# Patient Record
Sex: FEMALE | Race: OTHER | HISPANIC OR LATINO
[De-identification: names, ages, dates, MRNs, and addresses within clinical notes are randomized per-mention and may not be internally consistent; named-entity substitution may affect disease eponyms.]

---

## 2019-09-12 PROBLEM — Z00.00 ENCOUNTER FOR PREVENTIVE HEALTH EXAMINATION: Status: ACTIVE | Noted: 2019-09-12

## 2019-10-14 ENCOUNTER — APPOINTMENT (OUTPATIENT)
Dept: VASCULAR SURGERY | Facility: CLINIC | Age: 59
End: 2019-10-14
Payer: COMMERCIAL

## 2019-10-14 DIAGNOSIS — Z78.9 OTHER SPECIFIED HEALTH STATUS: ICD-10-CM

## 2019-10-14 DIAGNOSIS — I83.813 VARICOSE VEINS OF BILATERAL LOWER EXTREMITIES WITH PAIN: ICD-10-CM

## 2019-10-14 DIAGNOSIS — I87.2 VENOUS INSUFFICIENCY (CHRONIC) (PERIPHERAL): ICD-10-CM

## 2019-10-14 PROCEDURE — 93970 EXTREMITY STUDY: CPT

## 2019-10-14 PROCEDURE — XXXXX: CPT

## 2020-04-21 PROBLEM — I87.2 VENOUS INSUFFICIENCY: Status: ACTIVE | Noted: 2020-04-21

## 2020-04-21 PROBLEM — Z78.9 NO HISTORY OF ALCOHOL USE: Status: ACTIVE | Noted: 2020-04-21

## 2020-07-22 PROBLEM — Z00.00 ENCOUNTER FOR PREVENTIVE HEALTH EXAMINATION: Noted: 2020-07-22

## 2020-08-18 ENCOUNTER — APPOINTMENT (OUTPATIENT)
Dept: CARDIOLOGY | Facility: CLINIC | Age: 60
End: 2020-08-18
Payer: COMMERCIAL

## 2020-08-18 VITALS
DIASTOLIC BLOOD PRESSURE: 80 MMHG | RESPIRATION RATE: 14 BRPM | BODY MASS INDEX: 30 KG/M2 | HEART RATE: 73 BPM | SYSTOLIC BLOOD PRESSURE: 134 MMHG | HEIGHT: 62 IN | WEIGHT: 163 LBS | TEMPERATURE: 98.7 F

## 2020-08-18 DIAGNOSIS — Z78.9 OTHER SPECIFIED HEALTH STATUS: ICD-10-CM

## 2020-08-18 DIAGNOSIS — Z82.49 FAMILY HISTORY OF ISCHEMIC HEART DISEASE AND OTHER DISEASES OF THE CIRCULATORY SYSTEM: ICD-10-CM

## 2020-08-18 PROCEDURE — 93000 ELECTROCARDIOGRAM COMPLETE: CPT

## 2020-08-18 PROCEDURE — 99204 OFFICE O/P NEW MOD 45 MIN: CPT

## 2020-08-19 RX ORDER — CANDESARTAN CILEXETIL 16 MG/1
16 TABLET ORAL DAILY
Qty: 90 | Refills: 3 | Status: DISCONTINUED | COMMUNITY
Start: 2020-08-18 | End: 2020-08-19

## 2020-08-19 RX ORDER — ATORVASTATIN CALCIUM 10 MG/1
10 TABLET, FILM COATED ORAL DAILY
Qty: 90 | Refills: 3 | Status: ACTIVE | COMMUNITY
Start: 1900-01-01 | End: 1900-01-01

## 2020-11-12 ENCOUNTER — APPOINTMENT (OUTPATIENT)
Dept: CARDIOLOGY | Facility: CLINIC | Age: 60
End: 2020-11-12

## 2020-11-16 ENCOUNTER — APPOINTMENT (OUTPATIENT)
Dept: CARDIOLOGY | Facility: CLINIC | Age: 60
End: 2020-11-16

## 2020-11-17 ENCOUNTER — APPOINTMENT (OUTPATIENT)
Dept: CARDIOLOGY | Facility: CLINIC | Age: 60
End: 2020-11-17
Payer: COMMERCIAL

## 2020-11-17 PROCEDURE — 93015 CV STRESS TEST SUPVJ I&R: CPT

## 2020-11-17 PROCEDURE — 93306 TTE W/DOPPLER COMPLETE: CPT

## 2020-11-30 ENCOUNTER — NON-APPOINTMENT (OUTPATIENT)
Age: 60
End: 2020-11-30

## 2020-11-30 ENCOUNTER — APPOINTMENT (OUTPATIENT)
Dept: CARDIOLOGY | Facility: CLINIC | Age: 60
End: 2020-11-30
Payer: COMMERCIAL

## 2020-11-30 VITALS
RESPIRATION RATE: 14 BRPM | WEIGHT: 163 LBS | HEART RATE: 80 BPM | HEIGHT: 62 IN | TEMPERATURE: 97.9 F | SYSTOLIC BLOOD PRESSURE: 148 MMHG | BODY MASS INDEX: 30 KG/M2 | DIASTOLIC BLOOD PRESSURE: 78 MMHG

## 2020-11-30 DIAGNOSIS — I10 ESSENTIAL (PRIMARY) HYPERTENSION: ICD-10-CM

## 2020-11-30 DIAGNOSIS — E78.00 PURE HYPERCHOLESTEROLEMIA, UNSPECIFIED: ICD-10-CM

## 2020-11-30 DIAGNOSIS — R94.39 ABNORMAL RESULT OF OTHER CARDIOVASCULAR FUNCTION STUDY: ICD-10-CM

## 2020-11-30 DIAGNOSIS — R01.1 CARDIAC MURMUR, UNSPECIFIED: ICD-10-CM

## 2020-11-30 PROCEDURE — 99214 OFFICE O/P EST MOD 30 MIN: CPT

## 2020-11-30 PROCEDURE — 93000 ELECTROCARDIOGRAM COMPLETE: CPT

## 2020-11-30 RX ORDER — DEXAMETHASONE 4 MG/1
4 TABLET ORAL
Qty: 14 | Refills: 0 | Status: DISCONTINUED | COMMUNITY
Start: 2020-10-26

## 2020-11-30 RX ORDER — IBUPROFEN 400 MG/1
400 TABLET, FILM COATED ORAL
Qty: 42 | Refills: 0 | Status: DISCONTINUED | COMMUNITY
Start: 2020-10-26

## 2020-11-30 RX ORDER — VALSARTAN 160 MG/1
160 TABLET, COATED ORAL
Qty: 90 | Refills: 2 | Status: ACTIVE | COMMUNITY
Start: 2020-08-19 | End: 1900-01-01

## 2020-11-30 NOTE — DISCUSSION/SUMMARY
[FreeTextEntry1] : Case and plan discussed with Dr. Shahid.\par \par 1 - Hypertension:  repeat blood pressure 130/68.  Well controlled on current medications. Advised to follow strict low sodium diet and weight loss.\par \par 2 - Hypercholesterolemia:  labs from PCP (8/31/2020) - cholesterol 161, HDL 58, LDL 83, triglycerides 99, TC/HDL 2.8.  Continue Atorvastatin 10mg daily.  Follow low fat, low cholesterol diet.\par \par 3 - Risk factors for coronary artery disease including strong family history:  without complaints of chest pain, shortness of breath, palpitations, lightheadedness or syncope. \par Echocardigram (11/17/2020):  EF 55-60%.  Mild mitral valve prolapse.  Mild mitral annular calcification.  Mild to moderate mitral valve regurgitation.  Trace aortic regurgitation.  Mild tricuspid regurgitation.  No evidence of pericardial effusion.\par Exercise stress test (11/17/2020):  Equivocal exercise stress test for ischemia.  Borderline EKG changes and shortness of breath at  low workload.  Due to limited mobility secondary to sciatica will schedule for 1-day pharmacologic nuclear stress test to rule out CAD.\par \par 4 - Labs from PCP:  glucose 80, HgA1c 5.2, TSH 1.68, H/H 12.3/38.2, BUN 16, creatinine 1.0, potassium 5.\par \par 5 - Follow up with Dr. hSahid after testing is completed.

## 2020-11-30 NOTE — REASON FOR VISIT
[FreeTextEntry1] : The patient is a pleasant 60-year-old  female, native of the [place], with a past medical history significant for hypertension as well as hypercholesterolemia who presents for follow up evaluation.

## 2020-11-30 NOTE — HISTORY OF PRESENT ILLNESS
[FreeTextEntry1] : Mrs. Shepard presents today for follow up and results of her recent echocardiogram and exercise stress test.  Presently she is feeling well.  Denies complaints of chest pain, shortness of breath, palpitations, lightheadedness or syncope.  Drinks 2 cups of coffee daily and tries to hydrate well.  Does her best to follow a low sodium diet.

## 2020-11-30 NOTE — PHYSICAL EXAM
[General Appearance - Well Developed] : well developed [Normal Conjunctiva] : the conjunctiva exhibited no abnormalities [Normal Oral Mucosa] : normal oral mucosa [] : no respiratory distress [Auscultation Breath Sounds / Voice Sounds] : lungs were clear to auscultation bilaterally [Heart Rate And Rhythm] : heart rate and rhythm were normal [Heart Sounds] : normal S1 and S2 [Edema] : no peripheral edema present [Bowel Sounds] : normal bowel sounds [Abnormal Walk] : normal gait [FreeTextEntry1] : No edema [Skin Color & Pigmentation] : normal skin color and pigmentation [Skin Turgor] : normal skin turgor [Oriented To Time, Place, And Person] : oriented to person, place, and time [Affect] : the affect was normal [Mood] : the mood was normal

## 2020-12-08 ENCOUNTER — APPOINTMENT (OUTPATIENT)
Dept: PHYSICAL MEDICINE AND REHAB | Facility: CLINIC | Age: 60
End: 2020-12-08
Payer: COMMERCIAL

## 2020-12-08 VITALS
TEMPERATURE: 96.3 F | BODY MASS INDEX: 30 KG/M2 | DIASTOLIC BLOOD PRESSURE: 75 MMHG | SYSTOLIC BLOOD PRESSURE: 146 MMHG | HEART RATE: 84 BPM | RESPIRATION RATE: 14 BRPM | HEIGHT: 62 IN | OXYGEN SATURATION: 99 % | WEIGHT: 163 LBS

## 2020-12-08 VITALS — TEMPERATURE: 96.3 F

## 2020-12-08 DIAGNOSIS — M75.81 OTHER SHOULDER LESIONS, RIGHT SHOULDER: ICD-10-CM

## 2020-12-08 PROCEDURE — 99202 OFFICE O/P NEW SF 15 MIN: CPT

## 2020-12-08 PROCEDURE — 99072 ADDL SUPL MATRL&STAF TM PHE: CPT

## 2020-12-08 NOTE — ASSESSMENT
[FreeTextEntry1] : 60 y.o. F w/ right shoulder RC tendinopathy.  Low clinical concern for high grade RCT tear.  Rx P.T. for modalities, gentle ROM, stretching and strengthening exercises.  May trial OTC Voltaren gel prn.  May consider US examination of right shoulder RC tendons if necessary.  RTC 6-8 weeks.

## 2020-12-08 NOTE — PHYSICAL EXAM
[FreeTextEntry1] : General: NAD, alert and oriented x 3\par Psych: normal mood, intact judgment and insight\par HEENT: NC/AT, normal visual tracking\par Pulmonary: normal respiratory effort, chest expansion appears symmetrical\par CV: extremities appear pink and well perfused\par Abd: non-distended\par Ext: no c/c/e\par Skin: no rashes seen on exposed skin, no visible abrasions\par \par Inspection: normal muscle bulk without asymmetry\par Palpation: TTP over left SS\par ROM shoulder: right PROM 140' ABD/FF w/ pain endROM\par MMT: 5/5 b/l UE; 5/5 b/l SS; 5-/5 R IS.\par Reflexes: symmetric bilateral biceps, triceps, brachioradialis\par Bernardo's -negative\par Sensory:SILT\par Provocative testing:\par Spurling's neg \par Empty can - neg\par Hawkin's + pos right\par Neer's +pos right\par Lift off -neg\par Scarf -neg\par Speed's -neg\par

## 2020-12-08 NOTE — REASON FOR VISIT
[Initial Evaluation] : an initial evaluation [FreeTextEntry1] : [id number] [FreeTextEntry2] : Josselyn [TWNoteComboBox1] : [place]

## 2020-12-08 NOTE — HISTORY OF PRESENT ILLNESS
[FreeTextEntry1] : Ms. RIVERA PANCHAL is a 59 yo F - had R LE pain that started end of October 2020 which resolved after ibuprofen, oral steroids, and NESTOR.  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  Denies neck pain.  Pain present to posterior upper right arm. Worse with overhead arm activity, better with rest and warm compress.  Denies paresthesia, weakness, ataxia, B/B incontinence, or saddle anesthesia.  Have had left shoulder CSI by PCP for similar symptoms (which helped).  No P.T.

## 2021-01-18 ENCOUNTER — APPOINTMENT (OUTPATIENT)
Dept: CARDIOLOGY | Facility: CLINIC | Age: 61
End: 2021-01-18

## 2021-01-25 ENCOUNTER — APPOINTMENT (OUTPATIENT)
Dept: CARDIOLOGY | Facility: CLINIC | Age: 61
End: 2021-01-25

## 2021-02-02 ENCOUNTER — APPOINTMENT (OUTPATIENT)
Dept: PHYSICAL MEDICINE AND REHAB | Facility: CLINIC | Age: 61
End: 2021-02-02

## 2023-01-25 ENCOUNTER — OFFICE (OUTPATIENT)
Dept: URBAN - METROPOLITAN AREA CLINIC 63 | Facility: CLINIC | Age: 63
Setting detail: OPHTHALMOLOGY
End: 2023-01-25
Payer: COMMERCIAL

## 2023-01-25 DIAGNOSIS — H35.033: ICD-10-CM

## 2023-01-25 DIAGNOSIS — H01.004: ICD-10-CM

## 2023-01-25 DIAGNOSIS — H25.13: ICD-10-CM

## 2023-01-25 DIAGNOSIS — H04.123: ICD-10-CM

## 2023-01-25 DIAGNOSIS — H01.001: ICD-10-CM

## 2023-01-25 PROCEDURE — 92014 COMPRE OPH EXAM EST PT 1/>: CPT | Performed by: STUDENT IN AN ORGANIZED HEALTH CARE EDUCATION/TRAINING PROGRAM

## 2023-01-25 ASSESSMENT — AXIALLENGTH_DERIVED
OS_AL: 21.8115
OD_AL: 21.9371

## 2023-01-25 ASSESSMENT — TONOMETRY
OS_IOP_MMHG: 17
OD_IOP_MMHG: 15

## 2023-01-25 ASSESSMENT — REFRACTION_CURRENTRX
OS_AXIS: 180
OD_VPRISM_DIRECTION: PROGS
OD_ADD: +2.50
OD_AXIS: 180
OS_OVR_VA: 20/
OS_VPRISM_DIRECTION: PROGS
OS_CYLINDER: 0.00
OS_ADD: +2.50
OS_SPHERE: +3.25
OD_CYLINDER: 0.00
OD_SPHERE: +3.25
OD_OVR_VA: 20/

## 2023-01-25 ASSESSMENT — SPHEQUIV_DERIVED
OD_SPHEQUIV: 3.625
OS_SPHEQUIV: 4

## 2023-01-25 ASSESSMENT — KERATOMETRY
OS_K1POWER_DIOPTERS: 44.25
OD_K1POWER_DIOPTERS: 44.25
OS_K2POWER_DIOPTERS: 44.75
OS_AXISANGLE_DEGREES: 144
OD_K2POWER_DIOPTERS: 44.75
OD_AXISANGLE_DEGREES: 016

## 2023-01-25 ASSESSMENT — LID EXAM ASSESSMENTS
OS_BLEPHARITIS: LUL T
OD_BLEPHARITIS: RUL T
OS_COMMENTS: MEIBOMIAN GLAND DYSFUNCTION
OD_COMMENTS: MEIBOMIAN GLAND DYSFUNCTION

## 2023-01-25 ASSESSMENT — SUPERFICIAL PUNCTATE KERATITIS (SPK): OD_SPK: 1+

## 2023-01-25 ASSESSMENT — REFRACTION_AUTOREFRACTION
OD_AXIS: 095
OS_AXIS: 080
OS_CYLINDER: -1.00
OD_CYLINDER: -0.75
OS_SPHERE: +4.50
OD_SPHERE: +4.00

## 2023-01-25 ASSESSMENT — VISUAL ACUITY
OS_BCVA: 20/25
OD_BCVA: 20/30-1

## 2023-01-25 ASSESSMENT — CONFRONTATIONAL VISUAL FIELD TEST (CVF)
OS_FINDINGS: FULL
OD_FINDINGS: FULL

## 2024-01-30 ENCOUNTER — OFFICE (OUTPATIENT)
Dept: URBAN - METROPOLITAN AREA CLINIC 63 | Facility: CLINIC | Age: 64
Setting detail: OPHTHALMOLOGY
End: 2024-01-30
Payer: COMMERCIAL

## 2024-01-30 DIAGNOSIS — H35.033: ICD-10-CM

## 2024-01-30 DIAGNOSIS — H25.13: ICD-10-CM

## 2024-01-30 DIAGNOSIS — H01.004: ICD-10-CM

## 2024-01-30 DIAGNOSIS — H01.001: ICD-10-CM

## 2024-01-30 PROCEDURE — 92250 FUNDUS PHOTOGRAPHY W/I&R: CPT | Performed by: STUDENT IN AN ORGANIZED HEALTH CARE EDUCATION/TRAINING PROGRAM

## 2024-01-30 PROCEDURE — 92014 COMPRE OPH EXAM EST PT 1/>: CPT | Performed by: STUDENT IN AN ORGANIZED HEALTH CARE EDUCATION/TRAINING PROGRAM

## 2024-01-30 ASSESSMENT — REFRACTION_CURRENTRX
OD_AXIS: 180
OD_ADD: +2.50
OD_CYLINDER: 0.00
OS_VPRISM_DIRECTION: PROGS
OD_OVR_VA: 20/
OS_SPHERE: +3.25
OS_AXIS: 180
OS_CYLINDER: 0.00
OS_OVR_VA: 20/
OS_ADD: +2.50
OD_SPHERE: +3.25
OD_VPRISM_DIRECTION: PROGS

## 2024-01-30 ASSESSMENT — LID EXAM ASSESSMENTS
OS_MEIBOMITIS: 1+
OD_BLEPHARITIS: RUL T
OD_MEIBOMITIS: 1+
OS_BLEPHARITIS: LUL T

## 2024-01-30 ASSESSMENT — REFRACTION_AUTOREFRACTION
OS_AXIS: 079
OS_SPHERE: +4.25
OS_CYLINDER: -0.50
OD_CYLINDER: -0.25
OD_AXIS: 094
OD_SPHERE: +3.75

## 2024-01-30 ASSESSMENT — SPHEQUIV_DERIVED
OS_SPHEQUIV: 4
OD_SPHEQUIV: 3.625

## 2024-01-30 ASSESSMENT — CONFRONTATIONAL VISUAL FIELD TEST (CVF)
OD_FINDINGS: FULL
OS_FINDINGS: FULL

## 2024-01-30 ASSESSMENT — SUPERFICIAL PUNCTATE KERATITIS (SPK): OD_SPK: 1+

## 2024-06-13 ENCOUNTER — APPOINTMENT (OUTPATIENT)
Dept: BREAST CENTER | Facility: CLINIC | Age: 64
End: 2024-06-13
Payer: COMMERCIAL

## 2024-06-13 DIAGNOSIS — N63.20 UNSPECIFIED LUMP IN THE LEFT BREAST, UNSPECIFIED QUADRANT: ICD-10-CM

## 2024-06-13 PROCEDURE — 19285Z: CUSTOM

## 2024-06-13 PROCEDURE — 99205 OFFICE O/P NEW HI 60 MIN: CPT | Mod: 25

## 2024-06-13 NOTE — ASSESSMENT
[FreeTextEntry1] : New diagnosis of left breast cancer  Patient assistance scheduling breast MRI and genetic screening  A Philomena localizing clip was placed into the left breast cancer  A total of 1 hour was spent in consultation evaluation review

## 2024-06-13 NOTE — HISTORY OF PRESENT ILLNESS
[FreeTextEntry1] : Patient presents to the office for evaluation of biopsy-proven poorly differentiated invasive ductal cancer superior left breast  The patient denies palpable breast mass, pain, skin thickening  She has no first-degree relatives with breast cancer

## 2024-06-13 NOTE — PROCEDURE
[FreeTextEntry3] : Ultrasound-guided placement of Philomena localizing device in the left breast cancer  After informed consent obtained, 1% lidocaine was infiltrated to the skin and a Philomena localizing device was placed into the left breast cancer.  No procedure related complications were noted  BI-RADS 6

## 2024-06-17 ENCOUNTER — OUTPATIENT (OUTPATIENT)
Dept: OUTPATIENT SERVICES | Facility: HOSPITAL | Age: 64
LOS: 1 days | End: 2024-06-17
Payer: COMMERCIAL

## 2024-06-17 ENCOUNTER — APPOINTMENT (OUTPATIENT)
Dept: MAMMOGRAPHY | Facility: CLINIC | Age: 64
End: 2024-06-17
Payer: COMMERCIAL

## 2024-06-17 ENCOUNTER — RESULT REVIEW (OUTPATIENT)
Age: 64
End: 2024-06-17

## 2024-06-17 DIAGNOSIS — N63.20 UNSPECIFIED LUMP IN THE LEFT BREAST, UNSPECIFIED QUADRANT: ICD-10-CM

## 2024-06-17 PROCEDURE — 77065 DX MAMMO INCL CAD UNI: CPT

## 2024-06-17 PROCEDURE — G0279: CPT | Mod: 26

## 2024-06-17 PROCEDURE — G0279: CPT

## 2024-06-17 PROCEDURE — 77065 DX MAMMO INCL CAD UNI: CPT | Mod: 26,LT

## 2024-06-24 ENCOUNTER — OUTPATIENT (OUTPATIENT)
Dept: OUTPATIENT SERVICES | Facility: HOSPITAL | Age: 64
LOS: 1 days | End: 2024-06-24
Payer: COMMERCIAL

## 2024-06-24 ENCOUNTER — APPOINTMENT (OUTPATIENT)
Dept: MRI IMAGING | Facility: CLINIC | Age: 64
End: 2024-06-24
Payer: COMMERCIAL

## 2024-06-24 DIAGNOSIS — N63.20 UNSPECIFIED LUMP IN THE LEFT BREAST, UNSPECIFIED QUADRANT: ICD-10-CM

## 2024-06-24 DIAGNOSIS — Z00.8 ENCOUNTER FOR OTHER GENERAL EXAMINATION: ICD-10-CM

## 2024-06-24 PROCEDURE — C8908: CPT

## 2024-06-24 PROCEDURE — 77049 MRI BREAST C-+ W/CAD BI: CPT | Mod: 26

## 2024-06-24 PROCEDURE — C8937: CPT

## 2024-06-24 PROCEDURE — A9585: CPT

## 2024-06-25 DIAGNOSIS — N63.10 UNSPECIFIED LUMP IN THE RIGHT BREAST, UNSPECIFIED QUADRANT: ICD-10-CM

## 2024-07-15 ENCOUNTER — APPOINTMENT (OUTPATIENT)
Dept: MRI IMAGING | Facility: CLINIC | Age: 64
End: 2024-07-15
Payer: COMMERCIAL

## 2024-07-15 ENCOUNTER — RESULT REVIEW (OUTPATIENT)
Age: 64
End: 2024-07-15

## 2024-07-15 PROCEDURE — A9585: CPT | Mod: JW

## 2024-07-15 PROCEDURE — A4648: CPT

## 2024-07-15 PROCEDURE — 19085Z: CUSTOM | Mod: RT

## 2024-07-15 PROCEDURE — 77065 DX MAMMO INCL CAD UNI: CPT | Mod: RT

## 2024-07-23 ENCOUNTER — APPOINTMENT (OUTPATIENT)
Dept: BREAST CENTER | Facility: CLINIC | Age: 64
End: 2024-07-23
Payer: COMMERCIAL

## 2024-07-23 DIAGNOSIS — C50.412 MALIGNANT NEOPLASM OF UPPER-OUTER QUADRANT OF LEFT FEMALE BREAST: ICD-10-CM

## 2024-07-23 DIAGNOSIS — Z17.0 MALIGNANT NEOPLASM OF UPPER-OUTER QUADRANT OF LEFT FEMALE BREAST: ICD-10-CM

## 2024-07-23 PROCEDURE — 76641 ULTRASOUND BREAST COMPLETE: CPT

## 2024-07-23 PROCEDURE — 99214 OFFICE O/P EST MOD 30 MIN: CPT | Mod: 25

## 2024-07-24 PROBLEM — C50.412 MALIGNANT NEOPLASM OF UPPER-OUTER QUADRANT OF LEFT BREAST IN FEMALE, ESTROGEN RECEPTOR POSITIVE: Status: ACTIVE | Noted: 2024-07-24

## 2024-07-24 NOTE — ASSESSMENT
[FreeTextEntry1] : Left breast cancer  Philomena signal appreciated  Care plan reviewed  Will proceed with left breast lumpectomy with left axillary sentinel lymph node biopsy  Postoperative medical and radiation oncology evaluation  A total of 45 minutes was spent in consultation

## 2024-07-24 NOTE — HISTORY OF PRESENT ILLNESS
[FreeTextEntry1] : Patient returns to the office for interval assessment  She has biopsy-proven poorly differentiated invasive ductal cancer of the left breast which is ER positive/SD positive and HER2 negative  Recent MRI guided breast biopsy was unremarkable  The patient feels well  Denies palpable mass mass

## 2024-07-24 NOTE — PROCEDURE
[FreeTextEntry3] : Ultrasound left breast  The patient has biopsy-proven invasive ductal cancer of the left breast  Targeted exam of the left breast demonstrates a highly suspicious 1.5 cm hypoechoic nodule  There is no suspicious lymphadenopathy  No additional lesions are noted  BI-RADS 6

## 2024-07-24 NOTE — HISTORY OF PRESENT ILLNESS
[FreeTextEntry1] : Patient returns to the office for interval assessment  She has biopsy-proven poorly differentiated invasive ductal cancer of the left breast which is ER positive/[place] positive and HER2 negative  Recent MRI guided breast biopsy was unremarkable  The patient feels well  Denies palpable mass mass

## 2024-07-26 ENCOUNTER — RESULT REVIEW (OUTPATIENT)
Age: 64
End: 2024-07-26

## 2024-07-31 RX ORDER — OXYCODONE AND ACETAMINOPHEN 5; 325 MG/1; MG/1
5-325 TABLET ORAL EVERY 6 HOURS
Qty: 12 | Refills: 0 | Status: ACTIVE | COMMUNITY
Start: 2024-07-31 | End: 1900-01-01

## 2024-08-01 ENCOUNTER — TRANSCRIPTION ENCOUNTER (OUTPATIENT)
Age: 64
End: 2024-08-01

## 2024-08-02 ENCOUNTER — RESULT REVIEW (OUTPATIENT)
Age: 64
End: 2024-08-02

## 2024-08-02 ENCOUNTER — TRANSCRIPTION ENCOUNTER (OUTPATIENT)
Age: 64
End: 2024-08-02

## 2024-08-02 ENCOUNTER — APPOINTMENT (OUTPATIENT)
Dept: BREAST CENTER | Facility: HOSPITAL | Age: 64
End: 2024-08-02

## 2024-08-05 PROBLEM — M51.26 OTHER INTERVERTEBRAL DISC DISPLACEMENT, LUMBAR REGION: Chronic | Status: ACTIVE | Noted: 2024-07-26

## 2024-08-05 PROBLEM — D21.9 BENIGN NEOPLASM OF CONNECTIVE AND OTHER SOFT TISSUE, UNSPECIFIED: Chronic | Status: ACTIVE | Noted: 2024-07-26

## 2024-08-05 PROBLEM — E78.5 HYPERLIPIDEMIA, UNSPECIFIED: Chronic | Status: ACTIVE | Noted: 2024-07-26

## 2024-08-05 PROBLEM — C50.912 MALIGNANT NEOPLASM OF UNSPECIFIED SITE OF LEFT FEMALE BREAST: Chronic | Status: ACTIVE | Noted: 2024-07-26

## 2024-08-08 ENCOUNTER — APPOINTMENT (OUTPATIENT)
Dept: BREAST CENTER | Facility: CLINIC | Age: 64
End: 2024-08-08

## 2024-08-08 PROCEDURE — 99024 POSTOP FOLLOW-UP VISIT: CPT

## 2024-08-18 ENCOUNTER — OUTPATIENT (OUTPATIENT)
Dept: OUTPATIENT SERVICES | Facility: HOSPITAL | Age: 64
LOS: 1 days | Discharge: HOME | End: 2024-08-18

## 2024-08-18 DIAGNOSIS — Z90.722 ACQUIRED ABSENCE OF OVARIES, BILATERAL: Chronic | ICD-10-CM

## 2024-08-18 DIAGNOSIS — Z98.890 OTHER SPECIFIED POSTPROCEDURAL STATES: Chronic | ICD-10-CM

## 2024-08-18 DIAGNOSIS — C50.919 MALIGNANT NEOPLASM OF UNSPECIFIED SITE OF UNSPECIFIED FEMALE BREAST: ICD-10-CM

## 2024-08-27 ENCOUNTER — NON-APPOINTMENT (OUTPATIENT)
Age: 64
End: 2024-08-27

## 2024-08-28 ENCOUNTER — APPOINTMENT (OUTPATIENT)
Dept: HEMATOLOGY ONCOLOGY | Facility: CLINIC | Age: 64
End: 2024-08-28
Payer: COMMERCIAL

## 2024-08-28 ENCOUNTER — RESULT REVIEW (OUTPATIENT)
Age: 64
End: 2024-08-28

## 2024-08-28 VITALS
HEART RATE: 62 BPM | BODY MASS INDEX: 27.97 KG/M2 | RESPIRATION RATE: 17 BRPM | TEMPERATURE: 97.8 F | HEIGHT: 62 IN | OXYGEN SATURATION: 100 % | WEIGHT: 152 LBS | DIASTOLIC BLOOD PRESSURE: 73 MMHG | SYSTOLIC BLOOD PRESSURE: 115 MMHG

## 2024-08-28 DIAGNOSIS — C50.412 MALIGNANT NEOPLASM OF UPPER-OUTER QUADRANT OF LEFT FEMALE BREAST: ICD-10-CM

## 2024-08-28 DIAGNOSIS — Z17.0 MALIGNANT NEOPLASM OF UPPER-OUTER QUADRANT OF LEFT FEMALE BREAST: ICD-10-CM

## 2024-08-28 LAB
BASOPHILS # BLD AUTO: 0 K/UL — SIGNIFICANT CHANGE UP (ref 0–0.2)
BASOPHILS NFR BLD AUTO: 0.9 % — SIGNIFICANT CHANGE UP (ref 0–2)
EOSINOPHIL # BLD AUTO: 0.1 K/UL — SIGNIFICANT CHANGE UP (ref 0–0.5)
EOSINOPHIL NFR BLD AUTO: 2.8 % — SIGNIFICANT CHANGE UP (ref 0–6)
HCT VFR BLD CALC: 41.6 % — SIGNIFICANT CHANGE UP (ref 34.5–45)
HGB BLD-MCNC: 13.1 G/DL — SIGNIFICANT CHANGE UP (ref 11.5–15.5)
LYMPHOCYTES # BLD AUTO: 1.4 K/UL — SIGNIFICANT CHANGE UP (ref 1–3.3)
LYMPHOCYTES # BLD AUTO: 28.1 % — SIGNIFICANT CHANGE UP (ref 13–44)
MCHC RBC-ENTMCNC: 28.6 PG — SIGNIFICANT CHANGE UP (ref 27–34)
MCHC RBC-ENTMCNC: 31.6 G/DL — LOW (ref 32–36)
MCV RBC AUTO: 90.7 FL — SIGNIFICANT CHANGE UP (ref 80–100)
MONOCYTES # BLD AUTO: 0.4 K/UL — SIGNIFICANT CHANGE UP (ref 0–0.9)
MONOCYTES NFR BLD AUTO: 8.6 % — SIGNIFICANT CHANGE UP (ref 2–14)
NEUTROPHILS # BLD AUTO: 2.9 K/UL — SIGNIFICANT CHANGE UP (ref 1.8–7.4)
NEUTROPHILS NFR BLD AUTO: 59.6 % — SIGNIFICANT CHANGE UP (ref 43–77)
PLATELET # BLD AUTO: 227 K/UL — SIGNIFICANT CHANGE UP (ref 150–400)
RBC # BLD: 4.59 M/UL — SIGNIFICANT CHANGE UP (ref 3.8–5.2)
RBC # FLD: 12.4 % — SIGNIFICANT CHANGE UP (ref 10.3–14.5)
WBC # BLD: 4.9 K/UL — SIGNIFICANT CHANGE UP (ref 3.8–10.5)
WBC # FLD AUTO: 4.9 K/UL — SIGNIFICANT CHANGE UP (ref 3.8–10.5)

## 2024-08-28 PROCEDURE — 99205 OFFICE O/P NEW HI 60 MIN: CPT

## 2024-08-28 PROCEDURE — G2211 COMPLEX E/M VISIT ADD ON: CPT

## 2024-08-28 RX ORDER — PROCHLORPERAZINE MALEATE 10 MG/1
10 TABLET ORAL EVERY 6 HOURS
Qty: 30 | Refills: 1 | Status: ACTIVE | COMMUNITY
Start: 2024-08-28 | End: 1900-01-01

## 2024-08-28 RX ORDER — LIDOCAINE AND PRILOCAINE 25; 25 MG/G; MG/G
2.5-2.5 CREAM TOPICAL
Qty: 1 | Refills: 2 | Status: ACTIVE | COMMUNITY
Start: 2024-08-28 | End: 1900-01-01

## 2024-08-28 RX ORDER — ONDANSETRON 8 MG/1
8 TABLET ORAL
Qty: 90 | Refills: 1 | Status: ACTIVE | COMMUNITY
Start: 2024-08-28 | End: 1900-01-01

## 2024-08-28 RX ORDER — DEXAMETHASONE 4 MG/1
4 TABLET ORAL TWICE DAILY
Qty: 24 | Refills: 0 | Status: ACTIVE | COMMUNITY
Start: 2024-08-28 | End: 1900-01-01

## 2024-08-28 NOTE — HISTORY OF PRESENT ILLNESS
[Disease: _____________________] : Disease: [unfilled] [T: ___] : T[unfilled] [N: ___] : N[unfilled] [AJCC Stage: ____] : AJCC Stage: [unfilled] [de-identified] : Katlyn Davalos is a 64 yr old post menopausal female with Left invasive ductal carcinoma ER >90% HI 52% HER-2 2+/Equivocal FISH-Non amplified diagnosed at age 64  She initially had a screening bilateral mammogram on 2/21/24 which showed questioned distortion in the outer left breast ~7cmfn which may be projecting superiorly. No suspicious masses, architectural distortion, or significant calcifications are detected in the right breast. She then had a left diagnostic mammogram 5/13/24 which demonstrated a left breast spiculated asymmetry at 12-1:00 6-cmfn. Targeted sonogram done on the same day showed 0.8 x 0.7 x 0.9 cm hypoechoic irregularly-shaped nodule at 1:00 7cmfn producing no posterior acoustic shadowing or enhancement. She underwent Left breast sonogram guided core biopsy on 6/3/24 which showed poorly differentiated invasive ductal carcinoma, Neal score 8/9 (3+3+2), measuring 0.8cm in maximal length, with no definitive lymphovascular invasion or in situ component, ER >90% HI 52% HER-2 2+/Equivocal FISH-Non amplified Ki-67 49%.Oncotype Dx breast recurrence score: RS 33 DRR at 9 years 21% CT Benefit >15% She had katerina. breast MRI on 6/24/24 which demonstrated 1.9 cm irregular enhancing mass containing a biopsy clip and localization device artifact in the upper slightly outer left breast corresponding to the site of recent biopsy-proven malignancy. A 0.9 cm linear non mass enhancement in the right central breast middle depth which is indeterminate. MRI guided biopsy was recommended. The MRI guided biopsy of the right central breast which showed benign breast tissue with apocrine cysts. Subsequently she underwent left breast lumpectomy with left axillary sentinel lymph node biopsy on 8/2/24. Pathology revealed Infiltrating ductal carcinoma, Neal score 9/9, measuring 1.2 x1.2 x 1.1 cm, with high grade focal DCIS, solid type, with focal lympho vascular invasion. All margins negative for invasive carcinoma and DCIS, closest margin >10mm from invasive carcinoma and DCIS, all lymph node (2) negative for tumor ([id number])   Imaging/Procedures:   2/21/24 Bilateral screening mammo-No suspicious masses, architectural distortion, or significant calcifications are detected in the right breast. Questioned distortion in the outer left breast ~7cmfn which may be projecting  superiorly. Diagnostic mammogarm with targeted ultrasound is recommended BI-RADS 0  5/13/24 Left diagnostic mammo/sono-A left breast asymmetry seen at the screening mammography persists on additional mammographic views, on which it appears as spiculated asymmetry at 12-1 o'clock 6cmfn. targeted ultrasound was performed. At 1:00 7cmfn, there is a 0.8 x 0.7 x0.9 cm hypoechoic irregularly-shaped nodule producing no posterior acoustic shadowing or enhancement corresponding to a nodule visualized at mammogram Ultrasound guided biopsy was recommended. BI-RADS 4  6/3/24 Left breast 1:00, 7cmfn, US guided core biopsy:  -Poorly differentiated invasive ductal carcinoma, Neal score 8/9 (3+3+2), measuring 0.8cm in maximal length in this material. -No definitive lymphovascular invasion -No in situ component identified ER >90% HI 52% HER-2 2+/Equivocal FISH-Non amplified Ki-67 49%  6/3/24 Oncotype Dx breast recurrence score: RS 33 DRR at 9 years 21% CT Benefit >15%  6/24/24 Katerina breast MRI: 1.9 cm irregular enhancing mass containing a biopsy clip and localization device artifact in the upper slightly outer left breast corresponding to the site of recent biopsy-proven malignancy. 0.9 cm linear nonmass enhancement in the right central breast middle depth which is indeterminate. MRI guided biopsy is recommended. Dominant enhancing focus measuring 0.4 cm in the upper central anterior right breast which is thought to be probably benign. Six-month follow-up breast MRI reevaluation is recommended. No lymphadenopathy BI-RADS 4A  7/15/24 Breast, right central, MRI guided core biopsy:Benign breast tissue with apocrine cysts  [facility]: no clinically significant variants detected  8/2/24 s/p Left breast lumpectomy with leftt axillary sentinel node biopsy: Final Diagnosis 1.  Lymph node (LEFT axillary sentinel node #1): -   One partially fatty lymph node negative carcinoma (0/1).  2. Lymph node (LEFT axillary sentinel node #2): -   One partially fatty lymph node negative carcinoma (0/1). -   Associated thermal type artifact.  3.  Breast (LEFT lumpectomy): -   INFILTRATING DUCTAL CARCINOMA, [place] score 3+ 3+ 3 = 9/9, measuring 1.2 x 1.2 x 1.1 cm (see synoptic summary). -   Ductal Carcinoma In Situ (DCIS), focal, solid type with high nuclear grade and focal necrosis. -   Lymphovascular invasion, focal. -   Biopsy/clip changes. All margins negative for invasive carcinoma and DCIS, closest margin >10mm from invasive carcinoma and DCIS, all lymph node (2) negative for tumor. [id number]    Patient presents today for an initial consultation, accompanied by her daughter.  She states she is interested in pursuing whichever treatment is most effective.  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   PMHx: HTN, HLD FMHx of CA: none reported SHx: abdominoplasty (3/2024), hysterectomy w/ oophorectomy (2014) SocHx: employed as home attendant    Care team: Breast surgery: Dr. Sixto Babb [de-identified] : Poorly differentiated invasive ductal carcinoma, 1.2 cm [de-identified] : ER >90% IL 52% HER-2 2+/Equivocal FISH-Non amplified Ki-67 49% [de-identified] : 6/3/24 Oncotype Dx breast recurrence score: RS 33 DRR at 9 years 21% CT Benefit >15%

## 2024-08-28 NOTE — RESULTS/DATA
[FreeTextEntry1] : [place] Accession Number : [id number] Patient:     RIVERA PEÑA Accession:                             [id number]  Collected Date/Time:                   8/2/2024 11:46 EDT Received Date/Time:                    8/2/2024 12:27 EDT  Surgical Pathology Report - Auth (Verified)  Specimen(s) Submitted 1  Left axillary sentinel node #1 2  Left axillary sentinel node #2 3  Left breast lumpectomy 4  Left breast lumpectomy anterior margin 5  Left breast lumpectomy posterior margin 6  Left breast lumpectomy superior margin 7  Left breast lumpectomy inferior margin 8  Left breast lumpectomy medial margin 9  Left breast lumpectomy lateral margin   Final Diagnosis 1.  Lymph node (LEFT axillary sentinel node #1): -   One partially fatty lymph node negative carcinoma (0/1).  2. Lymph node (LEFT axillary sentinel node #2): -   One partially fatty lymph node negative carcinoma (0/1). -   Associated thermal type artifact.  3.  Breast (LEFT lumpectomy): -   INFILTRATING DUCTAL CARCINOMA, [place] score 3+ 3+ 3 = 9/9, measuring 1.2 x 1.2 x 1.1 cm (see synoptic summary). -   Ductal Carcinoma In Situ (DCIS), focal, solid type with high nuclear grade and focal necrosis. -   Lymphovascular invasion, focal. -   Biopsy/clip changes.  4.  Breast tissue (Left breast lumpectomy, anterior margin): -   Negative for carcinoma.  5.  Breast tissue (Left breast lumpectomy, posterior margin): -   Negative for carcinoma.  6.  Breast tissue (Left breast lumpectomy, superior margin): -   Negative for carcinoma.  7.  Breast tissue (Left breast lumpectomy, inferior margin): -   Negative for carcinoma.  8.  Breast tissue (Left breast lumpectomy, medial margin): -   Negative for carcinoma.  9.  Breast tissue (Left breast lumpectomy, lateral margin): -   Negative for carcinoma.  Verified by: AMBER HDEZ M.D. (Electronic Signature) Reported on: 08/07/24 14:28 EDT, [facility], [address] [place] [address] Phone: [phone]   Fax: [phone] _________________________________________________________________  Comment 3.  All or portions of this case have undergone intradepartmental review. (2) (6)  Previous noted.  This case represents a current or recent malignant diagnosis and as such we will have the case reported to Tumor Registry.  Please be reminded that all tumor registry cases must be accurately staged and tracked.  - For inpatients, please complete the tumor staging form on the patient's chart based on the clinical data which you have compiled.  - Please remind your office to respond promptly to the Tumor Registrar's request for patient follow-up.   Synoptic Summary 3: Breast Invasive Carcinoma - Resection Specimen Procedure:  Lumpectomy Specimen Laterality:   Left Tumor Histologic Type:   Invasive carcinoma of no special type (ductal) Histologic Grade (Neal Histologic Score): Glandular (Acinar) / Tubular Differentiation:    Score 3 Nuclear Pleomorphism:   Score 3 Mitotic Rate:   Score 3 Overall Grade:   Grade 3 (scores of 8 or 9) Tumor Size:  12 Millimeters (mm) Ductal Carcinoma In Situ (DCIS):    Present Lymphatic and / or Vascular Invasion:    Present - Focal Treatment Effect in the Breast:   No known presurgical therapy Margins Margin Status for Invasive Carcinoma:    All margins negative for invasive carcinoma Distance from Invasive Carcinoma to Closest Margin:    Greater than 10 mm Margin Status for DCIS:   All margins negative for DCIS Distance from DCIS to Closest Margin:    Greater than 10 mm Closest Margin(s) to DCIS:   Anterior Regional Lymph Nodes Regional Lymph Node Status:   All regional lymph nodes negative for tumor Total Number of Lymph Nodes Examined (sentinel and non-sentinel): 2 Number of [place] Nodes Examined:    2 pTNM Classification (AJCC 8th Edition) pT Category:   pT1c pN Category:   pN0 N Suffix:  (sn) Best Tumor Blocks for Future Studies Tumor Block(s):   3C  CAP [facility] 2023 Q4 Release  23-WJ- (per submitted report from [facility]), Results of Breast Biomarkers are as follows  : ER:   Positive.  Greater than 90%.  Strong intensity. IL:   Positive.  52%.  Strong intensity. Her 2:   Equivocal (2+).  FISH: Non-amplified.  Ratio 1.4.  Ki-67:   49% nuclear staining.  Clinical Information Left breast cancer  Gross Description 1.  Received fresh labeled  "left axillary sentinel node #1"  is a 2 x 1.4 x 1.2 cm yellow-tan fibroadipose tissue fragment. Embedded within the fat is a 0.5 x 0.3 x 0.3 cm tan, blue dyed rubbery lymph node. The lymph node is bisected and entirely submitted in one cassette: 1A.  2.  Received fresh labeled  "left axillary sentinel node #2"  is a 2.1 x 1 x 0.9 cm yellow-tan fibroadipose tissue fragment. Embedded within the fat is a 0.6 x 0.5 x 0.5 cm tan, dyed blue rubbery lymph node. The lymph node is bisected and entirely submitted in one cassette: 2A.  3.  Received: Fresh labeled  "left breast lumpectomy, 1 stitch anterior, 2 stitches lateral" Size:  6.2 x 4.5 x 2.5 cm Orientation:  1 stitch anterior, 2 stitches lateral Inking:  Anterior = yellow, posterior = black, superior = blue, medial = green Area of Interest - Mass Size:  1.2 x 1.2 x 1.1 cm, pink-tan, ill-defined, firm Distance to Margins:  0.4 cm from the superior edge, 0.7 cm from the posterior edge, 0.8 cm from the inferior edge, 1 cm from the anterior edge, 1.4 cm from the medial edge, and 2.3 cm from the lateral edge MammoClip:  An infinity biopsy clip is identified  Remaining Breast Tissue:   Yellow-tan, fibrofatty, soft to rubbery Additional Comments:   A Philomena clip is identified Submitted:  The lesion is entirely submitted and the remaining tissue is representatively submitted in 5 cassettes: 3A: Fibroadipose tissue medially adjacent to mass with superior edge 3B-3C: Mass with superior and posterior edges and biopsy clip site in cassette 3C 3D: Mass with inferior and posterior edges 3E: Remainder of Mass with superior and posterior edges  Time of Collection:  11:46 AM 08/02/2024 Cut Surface Exposed to Formalin:   12:51 PM 08/02/2024 Ischemic Time:  1.08 hours Total Fixation Time in Formalin:   79.65 hours Note: The breast specimen processed exceeds the fixation time standards of 6-72 hrs., which have been set by the American Society of Clinical Oncology (ASCO) and the College of American Pathologists (CAP), to ensure appropriate tissue quality for effective Her2 receptor testing. Note: The breast specimen ischemic time exceeds the standard of less than 60 minutes.  4.  Received in formalin labeled  "left breast lumpectomy anterior margin"  is a 1.5 x 1 x 0.6 cm yellow-tan fibroadipose tissue fragment with one surface previously inked green and an attached undesignated clip. The fragment is bisected and entirely submitted in one cassette: 4A.  5.  Received in formalin labeled  "left breast lumpectomy posterior margin"  is a 2.1 x 1.6 x 0.7 cm yellow-tan fibroadipose tissue fragment with one surface previously inked black and an attached undesignated clip. The fragment is serially sectioned and entirely submitted in two cassettes: 5A-5B.  6.  Received in formalin labeled  "left breast lumpectomy superior margin"  is a 2.3 x 1.6 x 0.6 cm yellow-tan, shaggy, friable fibroadipose tissue fragment with one surface previously inked red and an attached undesignated clip. The fragment is serially sectioned and entirely submitted in two cassettes: 6A-6B.  7.  Received in formalin labeled  "left breast lumpectomy inferior margin"  is a 1.5 x 1.2 x 0.8 cm yellow-tan fibroadipose tissue fragment with one surface previously inked blue and an attached undesignated clip. The fragment is bisected and entirely submitted in one cassette: 7A.  8.  Received in formalin labeled  "left breast lumpectomy medial margin"   is a 1.3 x 1.3 x 0.5 cm yellow-tan fibroadipose tissue fragment with one surface previously inked yellow and an attached undesignated clip. The fragment is trisected entirely submitted in one cassette: 8A.  9.  Received in formalin labeled  "left breast lumpectomy lateral margin"  is a 2.6 x 1.1 x 0.6 cm yellow-tan fibroadipose tissue fragment with one surface previously inked orange and an attached undesignated clip. The fragment is serially sectioned and entirely submitted in two cassettes: 9A-9B.  RAYNE Parnell (ASCP) 08/05/2024 08:14 AM  RAYNE Parnell (ASCP) 08/06/2024 09:28 AM   Disclaimer In addition to other data that may appear on the specimen containers, all labels have been inspected to confirm the presence of the patient's name and date of birth.

## 2024-08-28 NOTE — PHYSICAL EXAM
[Normal] : PERRL, EOMI, no conjunctival infection, anicteric [de-identified] : well healing lumpectomy scar left breast

## 2024-08-28 NOTE — ADDENDUM
[FreeTextEntry1] : Documented by Bossman Corral acting as scribe for Dr. Leroy on 08/28/2024.   All Medical record entries made by the Scribe were at my, Dr. Leroy's, direction and personally dictated by me on 08/28/2024. I have reviewed the chart and agree that the record accurately reflects my personal performance of the history, physical exam, assessment and plan. I have also personally directed, reviewed, and agreed with the discharge instructions.

## 2024-08-28 NOTE — RESULTS/DATA
[FreeTextEntry1] : [place]ina Accession Number : [id number] Patient:     RIVERA PEÑA Accession:                             [id number]  Collected Date/Time:                   8/2/2024 11:46 EDT Received Date/Time:                    8/2/2024 12:27 EDT  Surgical Pathology Report - Auth (Verified)  Specimen(s) Submitted 1  Left axillary sentinel node #1 2  Left axillary sentinel node #2 3  Left breast lumpectomy 4  Left breast lumpectomy anterior margin 5  Left breast lumpectomy posterior margin 6  Left breast lumpectomy superior margin 7  Left breast lumpectomy inferior margin 8  Left breast lumpectomy medial margin 9  Left breast lumpectomy lateral margin   Final Diagnosis 1.  Lymph node (LEFT axillary sentinel node #1): -   One partially fatty lymph node negative carcinoma (0/1).  2. Lymph node (LEFT axillary sentinel node #2): -   One partially fatty lymph node negative carcinoma (0/1). -   Associated thermal type artifact.  3.  Breast (LEFT lumpectomy): -   INFILTRATING DUCTAL CARCINOMA, [place]rose score 3+ 3+ 3 = 9/9, measuring 1.2 x 1.2 x 1.1 cm (see synoptic summary). -   Ductal Carcinoma In Situ (DCIS), focal, solid type with high nuclear grade and focal necrosis. -   Lymphovascular invasion, focal. -   Biopsy/clip changes.  4.  Breast tissue (Left breast lumpectomy, anterior margin): -   Negative for carcinoma.  5.  Breast tissue (Left breast lumpectomy, posterior margin): -   Negative for carcinoma.  6.  Breast tissue (Left breast lumpectomy, superior margin): -   Negative for carcinoma.  7.  Breast tissue (Left breast lumpectomy, inferior margin): -   Negative for carcinoma.  8.  Breast tissue (Left breast lumpectomy, medial margin): -   Negative for carcinoma.  9.  Breast tissue (Left breast lumpectomy, lateral margin): -   Negative for carcinoma.  Verified by: AMBER HDEZ M.D. (Electronic Signature) Reported on: 08/07/24 14:28 EDT, [facility], [address] [place] [address] Phone: [phone]   Fax: [phone] _________________________________________________________________  Comment 3.  All or portions of this case have undergone intradepartmental review. (2) (6)  Previous noted.  This case represents a current or recent malignant diagnosis and as such we will have the case reported to Tumor Registry.  Please be reminded that all tumor registry cases must be accurately staged and tracked.  - For inpatients, please complete the tumor staging form on the patient's chart based on the clinical data which you have compiled.  - Please remind your office to respond promptly to the Tumor Registrar's request for patient follow-up.   Synoptic Summary 3: Breast Invasive Carcinoma - Resection Specimen Procedure:  Lumpectomy Specimen Laterality:   Left Tumor Histologic Type:   Invasive carcinoma of no special type (ductal) Histologic Grade (Neal Histologic Score): Glandular (Acinar) / Tubular Differentiation:    Score 3 Nuclear Pleomorphism:   Score 3 Mitotic Rate:   Score 3 Overall Grade:   Grade 3 (scores of 8 or 9) Tumor Size:  12 Millimeters (mm) Ductal Carcinoma In Situ (DCIS):    Present Lymphatic and / or Vascular Invasion:    Present - Focal Treatment Effect in the Breast:   No known presurgical therapy Margins Margin Status for Invasive Carcinoma:    All margins negative for invasive carcinoma Distance from Invasive Carcinoma to Closest Margin:    Greater than 10 mm Margin Status for DCIS:   All margins negative for DCIS Distance from DCIS to Closest Margin:    Greater than 10 mm Closest Margin(s) to DCIS:   Anterior Regional Lymph Nodes Regional Lymph Node Status:   All regional lymph nodes negative for tumor Total Number of Lymph Nodes Examined (sentinel and non-sentinel): 2 Number of [place] Nodes Examined:    2 pTNM Classification (AJCC 8th Edition) pT Category:   pT1c pN Category:   pN0 N Suffix:  (sn) Best Tumor Blocks for Future Studies Tumor Block(s):   3C  CAP [facility] 2023 Q4 Release  90-SH- (per submitted report from [facility]), Results of Breast Biomarkers are as follows  : ER:   Positive.  Greater than 90%.  Strong intensity. MO:   Positive.  52%.  Strong intensity. Her 2:   Equivocal (2+).  FISH: Non-amplified.  Ratio 1.4.  Ki-67:   49% nuclear staining.  Clinical Information Left breast cancer  Gross Description 1.  Received fresh labeled  "left axillary sentinel node #1"  is a 2 x 1.4 x 1.2 cm yellow-tan fibroadipose tissue fragment. Embedded within the fat is a 0.5 x 0.3 x 0.3 cm tan, blue dyed rubbery lymph node. The lymph node is bisected and entirely submitted in one cassette: 1A.  2.  Received fresh labeled  "left axillary sentinel node #2"  is a 2.1 x 1 x 0.9 cm yellow-tan fibroadipose tissue fragment. Embedded within the fat is a 0.6 x 0.5 x 0.5 cm tan, dyed blue rubbery lymph node. The lymph node is bisected and entirely submitted in one cassette: 2A.  3.  Received: Fresh labeled  "left breast lumpectomy, 1 stitch anterior, 2 stitches lateral" Size:  6.2 x 4.5 x 2.5 cm Orientation:  1 stitch anterior, 2 stitches lateral Inking:  Anterior = yellow, posterior = black, superior = blue, medial = green Area of Interest - Mass Size:  1.2 x 1.2 x 1.1 cm, pink-tan, ill-defined, firm Distance to Margins:  0.4 cm from the superior edge, 0.7 cm from the posterior edge, 0.8 cm from the inferior edge, 1 cm from the anterior edge, 1.4 cm from the medial edge, and 2.3 cm from the lateral edge MammoClip:  An infinity biopsy clip is identified  Remaining Breast Tissue:   Yellow-tan, fibrofatty, soft to rubbery Additional Comments:   A Philomena clip is identified Submitted:  The lesion is entirely submitted and the remaining tissue is representatively submitted in 5 cassettes: 3A: Fibroadipose tissue medially adjacent to mass with superior edge 3B-3C: Mass with superior and posterior edges and biopsy clip site in cassette 3C 3D: Mass with inferior and posterior edges 3E: Remainder of Mass with superior and posterior edges  Time of Collection:  11:46 AM 08/02/2024 Cut Surface Exposed to Formalin:   12:51 PM 08/02/2024 Ischemic Time:  1.08 hours Total Fixation Time in Formalin:   79.65 hours Note: The breast specimen processed exceeds the fixation time standards of 6-72 hrs., which have been set by the American Society of Clinical Oncology (ASCO) and the College of American Pathologists (CAP), to ensure appropriate tissue quality for effective Her2 receptor testing. Note: The breast specimen ischemic time exceeds the standard of less than 60 minutes.  4.  Received in formalin labeled  "left breast lumpectomy anterior margin"  is a 1.5 x 1 x 0.6 cm yellow-tan fibroadipose tissue fragment with one surface previously inked green and an attached undesignated clip. The fragment is bisected and entirely submitted in one cassette: 4A.  5.  Received in formalin labeled  "left breast lumpectomy posterior margin"  is a 2.1 x 1.6 x 0.7 cm yellow-tan fibroadipose tissue fragment with one surface previously inked black and an attached undesignated clip. The fragment is serially sectioned and entirely submitted in two cassettes: 5A-5B.  6.  Received in formalin labeled  "left breast lumpectomy superior margin"  is a 2.3 x 1.6 x 0.6 cm yellow-tan, shaggy, friable fibroadipose tissue fragment with one surface previously inked red and an attached undesignated clip. The fragment is serially sectioned and entirely submitted in two cassettes: 6A-6B.  7.  Received in formalin labeled  "left breast lumpectomy inferior margin"  is a 1.5 x 1.2 x 0.8 cm yellow-tan fibroadipose tissue fragment with one surface previously inked blue and an attached undesignated clip. The fragment is bisected and entirely submitted in one cassette: 7A.  8.  Received in formalin labeled  "left breast lumpectomy medial margin"   is a 1.3 x 1.3 x 0.5 cm yellow-tan fibroadipose tissue fragment with one surface previously inked yellow and an attached undesignated clip. The fragment is trisected entirely submitted in one cassette: 8A.  9.  Received in formalin labeled  "left breast lumpectomy lateral margin"  is a 2.6 x 1.1 x 0.6 cm yellow-tan fibroadipose tissue fragment with one surface previously inked orange and an attached undesignated clip. The fragment is serially sectioned and entirely submitted in two cassettes: 9A-9B.  RAYNE Parnell (ASCP) 08/05/2024 08:14 AM  RAYNE Parnell (ASCP) 08/06/2024 09:28 AM   Disclaimer In addition to other data that may appear on the specimen containers, all labels have been inspected to confirm the presence of the patient's name and date of birth.

## 2024-08-28 NOTE — ASSESSMENT
[FreeTextEntry1] : 64 yr old female with stage 1A ([id number]) Left invasive ductal carcinoma ER >90% MA 52% HER-2 2+/Equivocal FISH-Non amplified diagnosed at age 64.  7/23/24 [facility] Genetics- Negative: no clinically significant variants detected  8/2/24 s/p Left breast lumpectomy with leftt axillary sentinel node biopsy Pathology c/w Infiltrating ductal carcinoma, Neal score 9/9, measuring 1.2 x1.2 x 1.1 cm, with high grade focal DCIS, solid type, with focal lympho vascular invasion. All margins negative for invasive carcinoma and DCIS. 0/2 Lns. pT1cN0.  6/3/24 Oncotype Dx RS: RS 33 with DRR at 11 years 21% with Gustafson or AI, chemo Benefit >15%  Today we discussed the available radiographic and pathologic data in detail. We also discussed the natural history of the disease, as well as management options.  We discussed role of Oncotype Dx RS  which ascertains low, intermediate, or high recurrence risk and helps guide decisions about adjuvant chemotherapy.  Oncotype Dx RS 33 is high risk and adjuvant chemotherapy was recommended.   We discussed TC (docetaxel/cyclophosphamide) x 4 cycles as chemotherapy regimen and discussed that side effects include but are not limited to fatigue, diarrhea nausea, vomiting, allergic reactions, increased risk of infection, low blood counts, neuropathy, and hair loss (~5% patients can have permanent hair loss).   Discussed alternative regimen of dose dense CMF every 2 weeks x 8 cycles.   She is agreeable to proceed with TC and signed informed consent.   Chemotherapy will be followed by subsequent RT and endocrine therapy.   Plan: -port placement -begin TC x 4 sessions after port placement -referred to [facility] for initial evaluation -RTO 2 weeks after C1 of TC
[FreeTextEntry1] : 64 yr old female with stage 1A ([id number]) Left invasive ductal carcinoma ER >90% CA 52% HER-2 2+/Equivocal FISH-Non amplified diagnosed at age 64.  7/23/24 [facility] Genetics- Negative: no clinically significant variants detected  8/2/24 s/p Left breast lumpectomy with leftt axillary sentinel node biopsy Pathology c/w Infiltrating ductal carcinoma, Neal score 9/9, measuring 1.2 x1.2 x 1.1 cm, with high grade focal DCIS, solid type, with focal lympho vascular invasion. All margins negative for invasive carcinoma and DCIS. 0/2 Lns. pT1cN0.  6/3/24 Oncotype Dx RS: RS 33 with DRR at 11 years 21% with Gustafson or AI, chemo Benefit >15%  Today we discussed the available radiographic and pathologic data in detail. We also discussed the natural history of the disease, as well as management options.  We discussed role of Oncotype Dx RS  which ascertains low, intermediate, or high recurrence risk and helps guide decisions about adjuvant chemotherapy.  Oncotype Dx RS 33 is high risk and adjuvant chemotherapy was recommended.   We discussed TC (docetaxel/cyclophosphamide) x 4 cycles as chemotherapy regimen and discussed that side effects include but are not limited to fatigue, diarrhea nausea, vomiting, allergic reactions, increased risk of infection, low blood counts, neuropathy, and hair loss (~5% patients can have permanent hair loss).   Discussed alternative regimen of dose dense CMF every 2 weeks x 8 cycles.   She is agreeable to proceed with TC and signed informed consent.   Chemotherapy will be followed by subsequent RT and endocrine therapy.   Plan: -port placement -begin TC x 4 sessions after port placement -referred to [facility] for initial evaluation -RTO 2 weeks after C1 of TC
Yes

## 2024-08-28 NOTE — PHYSICAL EXAM
[Normal] : PERRL, EOMI, no conjunctival infection, anicteric [de-identified] : well healing lumpectomy scar left breast

## 2024-08-28 NOTE — CONSULT LETTER
[Dear  ___] : Dear  [unfilled], [Please see my note below.] : Please see my note below. [Consult Closing:] : Thank you very much for allowing me to participate in the care of this patient.  If you have any questions, please do not hesitate to contact me. [Sincerely,] : Sincerely, [( Thank you for referring [unfilled] for consultation for _____ )] : Thank you for referring [unfilled] for consultation for [unfilled] [FreeTextEntry3] : Sterling Leroy MD Medical Oncology/Hematology [facility] [place], [facility]   [facility] [employer] [facility]   [DrBeatriz  ___] : Dr. DEL RIO

## 2024-08-28 NOTE — CONSULT LETTER
[Dear  ___] : Dear  [unfilled], [Please see my note below.] : Please see my note below. [Consult Closing:] : Thank you very much for allowing me to participate in the care of this patient.  If you have any questions, please do not hesitate to contact me. [Sincerely,] : Sincerely, [( Thank you for referring [unfilled] for consultation for _____ )] : Thank you for referring [unfilled] for consultation for [unfilled] [FreeTextEntry3] : Sterling Leroy MD Medical Oncology/Hematology [facility] [place], [facility]   [facility] [employer] [facility] at [facility]   [DrBeatriz  ___] : Dr. DEL RIO

## 2024-08-28 NOTE — REASON FOR VISIT
[[employer] ] : provided by [employer]   [Time Spent: ____ minutes] : Total time spent using  services: [unfilled] minutes. The patient's primary language is not English thus required  services. [Family Member] : family member [FreeTextEntry2] : Left Breast cancer [Interpreters_IDNumber] : [id number] [Interpreters_FullName] : [place] [TWNoteComboBox1] : [place]

## 2024-08-28 NOTE — REASON FOR VISIT
[[employer] ] : provided by [employer]   [Time Spent: ____ minutes] : Total time spent using  services: [unfilled] minutes. The patient's primary language is not English thus required  services. [Family Member] : family member [FreeTextEntry2] : Left Breast cancer [Interpreters_IDNumber] : [id number] [Interpreters_FullName] : [place] [TWNoteComboBox1] : [place]ian

## 2024-08-28 NOTE — HISTORY OF PRESENT ILLNESS
[Disease: _____________________] : Disease: [unfilled] [T: ___] : T[unfilled] [N: ___] : N[unfilled] [AJCC Stage: ____] : AJCC Stage: [unfilled] [de-identified] : Katlyn Davalos is a 64 yr old post menopausal female with Left invasive ductal carcinoma ER >90% NE 52% HER-2 2+/Equivocal FISH-Non amplified diagnosed at age 64  She initially had a screening bilateral mammogram on 2/21/24 which showed questioned distortion in the outer left breast ~7cmfn which may be projecting superiorly. No suspicious masses, architectural distortion, or significant calcifications are detected in the right breast. She then had a left diagnostic mammogram 5/13/24 which demonstrated a left breast spiculated asymmetry at 12-1:00 6-cmfn. Targeted sonogram done on the same day showed 0.8 x 0.7 x 0.9 cm hypoechoic irregularly-shaped nodule at 1:00 7cmfn producing no posterior acoustic shadowing or enhancement. She underwent Left breast sonogram guided core biopsy on 6/3/24 which showed poorly differentiated invasive ductal carcinoma, Neal score 8/9 (3+3+2), measuring 0.8cm in maximal length, with no definitive lymphovascular invasion or in situ component, ER >90% NE 52% HER-2 2+/Equivocal FISH-Non amplified Ki-67 49%.Oncotype Dx breast recurrence score: RS 33 DRR at 11 years 21% CT Benefit >15% She had katerina. breast MRI on 6/24/24 which demonstrated 1.9 cm irregular enhancing mass containing a biopsy clip and localization device artifact in the upper slightly outer left breast corresponding to the site of recent biopsy-proven malignancy. A 0.9 cm linear non mass enhancement in the right central breast middle depth which is indeterminate. MRI guided biopsy was recommended. The MRI guided biopsy of the right central breast which showed benign breast tissue with apocrine cysts. Subsequently she underwent left breast lumpectomy with left axillary sentinel lymph node biopsy on 8/2/24. Pathology revealed Infiltrating ductal carcinoma, Neal score 9/9, measuring 1.2 x1.2 x 1.1 cm, with high grade focal DCIS, solid type, with focal lympho vascular invasion. All margins negative for invasive carcinoma and DCIS, closest margin >10mm from invasive carcinoma and DCIS, all lymph node (2) negative for tumor ([id number])   Imaging/Procedures:   2/21/24 Bilateral screening mammo-No suspicious masses, architectural distortion, or significant calcifications are detected in the right breast. Questioned distortion in the outer left breast ~7cmfn which may be projecting  superiorly. Diagnostic mammogarm with targeted ultrasound is recommended BI-RADS 0  5/13/24 Left diagnostic mammo/sono-A left breast asymmetry seen at the screening mammography persists on additional mammographic views, on which it appears as spiculated asymmetry at 12-1 o'clock 6cmfn. targeted ultrasound was performed. At 1:00 7cmfn, there is a 0.8 x 0.7 x0.9 cm hypoechoic irregularly-shaped nodule producing no posterior acoustic shadowing or enhancement corresponding to a nodule visualized at mammogram Ultrasound guided biopsy was recommended. BI-RADS 4  6/3/24 Left breast 1:00, 7cmfn, US guided core biopsy:  -Poorly differentiated invasive ductal carcinoma, Neal score 8/9 (3+3+2), measuring 0.8cm in maximal length in this material. -No definitive lymphovascular invasion -No in situ component identified ER >90% NE 52% HER-2 2+/Equivocal FISH-Non amplified Ki-67 49%  6/3/24 Oncotype Dx breast recurrence score: RS 33 DRR at 11 years 21% CT Benefit >15%  6/24/24 Katerina breast MRI: 1.9 cm irregular enhancing mass containing a biopsy clip and localization device artifact in the upper slightly outer left breast corresponding to the site of recent biopsy-proven malignancy. 0.9 cm linear nonmass enhancement in the right central breast middle depth which is indeterminate. MRI guided biopsy is recommended. Dominant enhancing focus measuring 0.4 cm in the upper central anterior right breast which is thought to be probably benign. Six-month follow-up breast MRI reevaluation is recommended. No lymphadenopathy BI-RADS 4A  7/15/24 Breast, right central, MRI guided core biopsy:Benign breast tissue with apocrine cysts  [facility]- Negative: no clinically significant variants detected  8/2/24 s/p Left breast lumpectomy with leftt axillary sentinel node biopsy: Final Diagnosis 1.  Lymph node (LEFT axillary sentinel node #1): -   One partially fatty lymph node negative carcinoma (0/1).  2. Lymph node (LEFT axillary sentinel node #2): -   One partially fatty lymph node negative carcinoma (0/1). -   Associated thermal type artifact.  3.  Breast (LEFT lumpectomy): -   INFILTRATING DUCTAL CARCINOMA, [place] score 3+ 3+ 3 = 9/9, measuring 1.2 x 1.2 x 1.1 cm (see synoptic summary). -   Ductal Carcinoma In Situ (DCIS), focal, solid type with high nuclear grade and focal necrosis. -   Lymphovascular invasion, focal. -   Biopsy/clip changes. All margins negative for invasive carcinoma and DCIS, closest margin >10mm from invasive carcinoma and DCIS, all lymph node (2) negative for tumor. [id number]    Patient presents today for an initial consultation, accompanied by her daughter.  She states she is interested in pursuing whichever treatment is most effective.  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   PMHx: HTN, HLD FMHx of CA: none reported SHx: abdominoplasty (3/2024), hysterectomy w/ oophorectomy (2014) SocHx: employed as home attendant    Care team: Breast surgery: Dr. Sixto Babb [de-identified] : Poorly differentiated invasive ductal carcinoma, 1.2 cm [de-identified] : ER >90% NH 52% HER-2 2+/Equivocal FISH-Non amplified Ki-67 49% [de-identified] : 6/3/24 Oncotype Dx breast recurrence score: RS 33 DRR at 11 years 21% CT Benefit >15%

## 2024-08-29 LAB
ALBUMIN SERPL ELPH-MCNC: 4.6 G/DL
ALP BLD-CCNC: 85 U/L
ALT SERPL-CCNC: 10 U/L
ANION GAP SERPL CALC-SCNC: 11 MMOL/L
AST SERPL-CCNC: 15 U/L
BILIRUB SERPL-MCNC: 0.5 MG/DL
BUN SERPL-MCNC: 18 MG/DL
CALCIUM SERPL-MCNC: 9.9 MG/DL
CHLORIDE SERPL-SCNC: 101 MMOL/L
CO2 SERPL-SCNC: 28 MMOL/L
CREAT SERPL-MCNC: 0.94 MG/DL
EGFR: 69 ML/MIN/1.73M2
GLUCOSE SERPL-MCNC: 91 MG/DL
HBV CORE IGG+IGM SER QL: REACTIVE
HBV CORE IGM SER QL: NONREACTIVE
HBV SURFACE AB SER QL: ABNORMAL
HBV SURFACE AG SER QL: NONREACTIVE
HCV AB SER QL: NONREACTIVE
HCV S/CO RATIO: 0.16 S/CO
INR PPP: 0.95 RATIO
POTASSIUM SERPL-SCNC: 4.4 MMOL/L
PROT SERPL-MCNC: 7.3 G/DL
PT BLD: 10.9 SEC
SODIUM SERPL-SCNC: 140 MMOL/L

## 2024-08-30 ENCOUNTER — APPOINTMENT (OUTPATIENT)
Dept: HEMATOLOGY ONCOLOGY | Facility: CLINIC | Age: 64
End: 2024-08-30

## 2024-09-03 LAB
HEPB DNA PCR INT: NOT DETECTED
HEPB DNA PCR LOG: NOT DETECTED LOGIU/ML

## 2024-09-09 ENCOUNTER — NON-APPOINTMENT (OUTPATIENT)
Age: 64
End: 2024-09-09

## 2024-09-09 NOTE — HISTORY OF PRESENT ILLNESS
[FreeTextEntry1] :  PRE CALL PRIOR TO APPOINTMENT:    Phone Number: [phone]  RX on file:  yes  Referring MD:  Rad  Appointment date:  1960  Currently on Chemotherapy:  no              CBC within 48 hours:  WBC:       ANC:    Diabetic:    Clotting or Bleeding disorders:  no  PPM / Defibrillator:  no  NPO status advised:  yes  History of fall:   no    Assistant device for walking:  na   home:   [facility]   Blood Thinners:  None  Prescribing MD agree to have medication held:  n/a  Capacity to make decisions: yes  HCP:  no  DNR:  no  Person contact for Pre-Call:  Patient    Guidelines for Screening Anesthesia / Sedation Cases     (TYPE YES OR NO)      Obtain Prescription / Authorization from Referring Physician: YES              Medical Necessity (Justification of the need for Anesthesia / Sedation) from referring Physician: YES            Have you taken oral sedation? (e.g. Xanax, Valium, and other oral sedatives):  NO               Clearance to receive Anesthesia / Sedation: YES- BY Dr. Gupta        ANESTHESIA EXCLUSION CRITERIA QUESTIONNAIRE:         Difficult Airway: (TYPE YES OR NO) no       Previous Problem with Anesthesia or sedation: NO             History of Difficult IntubatioN: NO             Stridor, Snoring, Sleep Apnea: None  Tonsils / Adenoids present: YES   Dysmorphic Facial Features: NO                 Cervical Spine Fusion/ Cervical Spine Surgery: NO               Tumor in Airway: NO       Trauma to Airway: NO    Radiation therapy to head / neck: NO      Advanced Rheumatoid ArthritiS: NO         Active Cardiac Ischemia (as defined by EKG or Laboratory  Examination): NO          Severe COPD / Home O2: NO       Known or Suspected Increased Intracranial pressure: NO                Patient with BMI of 40 or greater : NO    Weight (kgs.)  149        Height (ft.)  5'2         BMI                  Patients with Increased Risk of Aspiration: (TYPE YES OR NO)                          Abnormal Airway: NO     Hiatal Hernia with Reflux: NO      Pregnancy: NO                   Altered Mental State: NO                               Spinal Cord Injury with Paraplegia or Quadriplegia: NO                     History of delayed Gastric Emptying: NO                                  ASA Physical Status of 3 or greater (See Appendix 1 from policy ANSL.5502)                              Malignant Hyperthermia Screening: (TYPE YES OR NO)       Family history unexpected death following anesthesia: NO             Family or personal history of Malignant Hyperthermia: NO                A muscle or neuromuscular disorder: NO                 [facility] -Operative Assessment ( Day of Procedure)      Procedure: Port Insert  Indication:    NPO status:  Accompanied by:    Falls risk:  na  Labs: see chart    IVL:   IR MD: Dr. Sheron Cornelius    Urine Pregnancy: na  Pre-Op instructions: provided  POST-OP teaching initiated:  yes  Allergy bracelet on:   Anesthesia plan discussed with IR MD:  yes  Antibiotic given:

## 2024-09-09 NOTE — REASON FOR VISIT
[Procedure: _________] : a [unfilled] procedure visit [FreeTextEntry1] : Dx:Malignant neoplasm of the left breast

## 2024-09-18 ENCOUNTER — APPOINTMENT (OUTPATIENT)
Dept: INTERVENTIONAL RADIOLOGY/VASCULAR | Facility: CLINIC | Age: 64
End: 2024-09-18
Payer: COMMERCIAL

## 2024-09-18 ENCOUNTER — OUTPATIENT (OUTPATIENT)
Dept: OUTPATIENT SERVICES | Facility: HOSPITAL | Age: 64
LOS: 1 days | End: 2024-09-18

## 2024-09-18 VITALS
TEMPERATURE: 98 F | OXYGEN SATURATION: 100 % | DIASTOLIC BLOOD PRESSURE: 78 MMHG | RESPIRATION RATE: 16 BRPM | SYSTOLIC BLOOD PRESSURE: 153 MMHG | HEART RATE: 65 BPM

## 2024-09-18 DIAGNOSIS — C50.412 MALIGNANT NEOPLASM OF UPPER-OUTER QUADRANT OF LEFT FEMALE BREAST: ICD-10-CM

## 2024-09-18 DIAGNOSIS — Z90.722 ACQUIRED ABSENCE OF OVARIES, BILATERAL: Chronic | ICD-10-CM

## 2024-09-18 DIAGNOSIS — Z98.890 OTHER SPECIFIED POSTPROCEDURAL STATES: Chronic | ICD-10-CM

## 2024-09-18 PROCEDURE — 77001 FLUOROGUIDE FOR VEIN DEVICE: CPT | Mod: 26

## 2024-09-18 PROCEDURE — 76937 US GUIDE VASCULAR ACCESS: CPT | Mod: 26

## 2024-09-18 PROCEDURE — 36561 INSERT TUNNELED CV CATH: CPT | Mod: RT

## 2024-09-18 NOTE — HISTORY OF PRESENT ILLNESS
[FreeTextEntry1] : PRE CALL PRIOR TO APPOINTMENT:    Phone Number: [phone]  RX on file:  yes  Referring MD:  Rad  Appointment date:  1962  Currently on Chemotherapy:  no              CBC within 48 hours:  WBC:       ANC:    Diabetic:    Clotting or Bleeding disorders:  no  PPM / Defibrillator:  no  NPO status advised:  yes  History of fall:   no    Assistant device for walking:  na   home:   [facility]   Blood Thinners:  None  Prescribing MD agree to have medication held:  n/a  Capacity to make decisions: yes  HCP:  no  DNR:  no  Person contact for Pre-Call:  Patient    Guidelines for Screening Anesthesia / Sedation Cases     (TYPE YES OR NO)      Obtain Prescription / Authorization from Referring Physician: YES              Medical Necessity (Justification of the need for Anesthesia / Sedation) from referring Physician: YES            Have you taken oral sedation? (e.g. Xanax, Valium, and other oral sedatives):  NO               Clearance to receive Anesthesia / Sedation: YES- BY Dr. Gupta        ANESTHESIA EXCLUSION CRITERIA QUESTIONNAIRE:         Difficult Airway: (TYPE YES OR NO) no       Previous Problem with Anesthesia or sedation: NO             History of Difficult IntubatioN: NO             Stridor, Snoring, Sleep Apnea: None  Tonsils / Adenoids present: YES   Dysmorphic Facial Features: NO                 Cervical Spine Fusion/ Cervical Spine Surgery: NO               Tumor in Airway: NO       Trauma to Airway: NO    Radiation therapy to head / neck: NO      Advanced Rheumatoid ArthritiS: NO         Active Cardiac Ischemia (as defined by EKG or Laboratory  Examination): NO          Severe COPD / Home O2: NO       Known or Suspected Increased Intracranial pressure: NO                Patient with BMI of 40 or greater : NO    Weight (kgs.)  149        Height (ft.)  5'2         BMI                  Patients with Increased Risk of Aspiration: (TYPE YES OR NO)                          Abnormal Airway: NO     Hiatal Hernia with Reflux: NO      Pregnancy: NO                   Altered Mental State: NO                               Spinal Cord Injury with Paraplegia or Quadriplegia: NO                     History of delayed Gastric Emptying: NO                                  ASA Physical Status of 3 or greater (See Appendix 1 from policy ANSL.5502)                              Malignant Hyperthermia Screening: (TYPE YES OR NO)       Family history unexpected death following anesthesia: NO             Family or personal history of Malignant Hyperthermia: NO                A muscle or neuromuscular disorder: NO                 [facility] -Operative Assessment ( Day of Procedure)      Procedure: Port Insert  Indication:  breast CA  NPO status: 9/17/24 @ 2100  Accompanied by:  daughter  Falls risk:  na  Labs: see chart    IVL: #22 L AC by SS  IR MD: Dr. Sheron Cornelius    Urine Pregnancy: na  Pre-Op instructions: provided  POST-OP teaching initiated:  yes  Allergy bracelet on: NKDA   Anesthesia plan discussed with IR MD:  yes  Antibiotic given: no

## 2024-09-18 NOTE — HISTORY OF PRESENT ILLNESS
[FreeTextEntry1] : PRE CALL PRIOR TO APPOINTMENT:    Phone Number: [phone]  RX on file:  yes  Referring MD:  Rad  Appointment date:  1962  Currently on Chemotherapy:  no              CBC within 48 hours:  WBC:       ANC:    Diabetic:    Clotting or Bleeding disorders:  no  PPM / Defibrillator:  no  NPO status advised:  yes  History of fall:   no    Assistant device for walking:  na   home:   [facility]   Blood Thinners:  None  Prescribing MD agree to have medication held:  n/a  Capacity to make decisions: yes  HCP:  no  DNR:  no  Person contact for Pre-Call:  Patient    Guidelines for Screening Anesthesia / Sedation Cases     (TYPE YES OR NO)      Obtain Prescription / Authorization from Referring Physician: YES              Medical Necessity (Justification of the need for Anesthesia / Sedation) from referring Physician: YES            Have you taken oral sedation? (e.g. Xanax, Valium, and other oral sedatives):  NO               Clearance to receive Anesthesia / Sedation: YES- BY Dr. Gupta        ANESTHESIA EXCLUSION CRITERIA QUESTIONNAIRE:         Difficult Airway: (TYPE YES OR NO) no       Previous Problem with Anesthesia or sedation: NO             History of Difficult IntubatioN: NO             Stridor, Snoring, Sleep Apnea: None  Tonsils / Adenoids present: YES   Dysmorphic Facial Features: NO                 Cervical Spine Fusion/ Cervical Spine Surgery: NO               Tumor in Airway: NO       Trauma to Airway: NO    Radiation therapy to head / neck: NO      Advanced Rheumatoid ArthritiS: NO         Active Cardiac Ischemia (as defined by EKG or Laboratory  Examination): NO          Severe COPD / Home O2: NO       Known or Suspected Increased Intracranial pressure: NO                Patient with BMI of 40 or greater : NO    Weight (kgs.)  149        Height (ft.)  5'2         BMI                  Patients with Increased Risk of Aspiration: (TYPE YES OR NO)                          Abnormal Airway: NO     Hiatal Hernia with Reflux: NO      Pregnancy: NO                   Altered Mental State: NO                               Spinal Cord Injury with Paraplegia or Quadriplegia: NO                     History of delayed Gastric Emptying: NO                                  ASA Physical Status of 3 or greater (See Appendix 1 from policy ANSL.5502)                              Malignant Hyperthermia Screening: (TYPE YES OR NO)       Family history unexpected death following anesthesia: NO             Family or personal history of Malignant Hyperthermia: NO                A muscle or neuromuscular disorder: NO                 [facility] -Operative Assessment ( Day of Procedure)      Procedure: Port Insert  Indication:  breast CA  NPO status: 9/17/24 @ 2100  Accompanied by:  daughter  Falls risk:  na  Labs: see chart    IVL: #22 L AC by SS  IR MD: Dr. Sheron Cornelius    Urine Pregnancy: na  Pre-Op instructions: provided  POST-OP teaching initiated:  yes  Allergy bracelet on: NKDA   Anesthesia plan discussed with IR MD:  yes  Antibiotic given: no

## 2024-09-25 ENCOUNTER — NON-APPOINTMENT (OUTPATIENT)
Age: 64
End: 2024-09-25

## 2024-09-26 ENCOUNTER — RESULT REVIEW (OUTPATIENT)
Age: 64
End: 2024-09-26

## 2024-09-26 ENCOUNTER — APPOINTMENT (OUTPATIENT)
Age: 64
End: 2024-09-26

## 2024-09-26 LAB
BASOPHILS # BLD AUTO: 0 K/UL — SIGNIFICANT CHANGE UP (ref 0–0.2)
BASOPHILS NFR BLD AUTO: 0.2 % — SIGNIFICANT CHANGE UP (ref 0–2)
EOSINOPHIL # BLD AUTO: 0 K/UL — SIGNIFICANT CHANGE UP (ref 0–0.5)
EOSINOPHIL NFR BLD AUTO: 0.1 % — SIGNIFICANT CHANGE UP (ref 0–6)
HCT VFR BLD CALC: 40.9 % — SIGNIFICANT CHANGE UP (ref 34.5–45)
HGB BLD-MCNC: 13 G/DL — SIGNIFICANT CHANGE UP (ref 11.5–15.5)
LYMPHOCYTES # BLD AUTO: 0.9 K/UL — LOW (ref 1–3.3)
LYMPHOCYTES # BLD AUTO: 8.6 % — LOW (ref 13–44)
MCHC RBC-ENTMCNC: 28.5 PG — SIGNIFICANT CHANGE UP (ref 27–34)
MCHC RBC-ENTMCNC: 31.8 G/DL — LOW (ref 32–36)
MCV RBC AUTO: 89.8 FL — SIGNIFICANT CHANGE UP (ref 80–100)
MONOCYTES # BLD AUTO: 0.1 K/UL — SIGNIFICANT CHANGE UP (ref 0–0.9)
MONOCYTES NFR BLD AUTO: 1.2 % — LOW (ref 2–14)
NEUTROPHILS # BLD AUTO: 9 K/UL — HIGH (ref 1.8–7.4)
NEUTROPHILS NFR BLD AUTO: 90 % — HIGH (ref 43–77)
PLATELET # BLD AUTO: 238 K/UL — SIGNIFICANT CHANGE UP (ref 150–400)
RBC # BLD: 4.56 M/UL — SIGNIFICANT CHANGE UP (ref 3.8–5.2)
RBC # FLD: 11.9 % — SIGNIFICANT CHANGE UP (ref 10.3–14.5)
WBC # BLD: 10 K/UL — SIGNIFICANT CHANGE UP (ref 3.8–10.5)
WBC # FLD AUTO: 10 K/UL — SIGNIFICANT CHANGE UP (ref 3.8–10.5)

## 2024-09-27 DIAGNOSIS — Z51.89 ENCOUNTER FOR OTHER SPECIFIED AFTERCARE: ICD-10-CM

## 2024-09-27 DIAGNOSIS — R11.2 NAUSEA WITH VOMITING, UNSPECIFIED: ICD-10-CM

## 2024-09-27 DIAGNOSIS — Z51.11 ENCOUNTER FOR ANTINEOPLASTIC CHEMOTHERAPY: ICD-10-CM

## 2024-09-27 LAB
ALBUMIN SERPL ELPH-MCNC: 4.4 G/DL — SIGNIFICANT CHANGE UP (ref 3.3–5)
ALP SERPL-CCNC: 72 U/L — SIGNIFICANT CHANGE UP (ref 40–120)
ALT FLD-CCNC: 12 U/L — SIGNIFICANT CHANGE UP (ref 10–45)
ANION GAP SERPL CALC-SCNC: 15 MMOL/L — SIGNIFICANT CHANGE UP (ref 5–17)
AST SERPL-CCNC: 21 U/L — SIGNIFICANT CHANGE UP (ref 10–40)
BILIRUB SERPL-MCNC: 0.4 MG/DL — SIGNIFICANT CHANGE UP (ref 0.2–1.2)
BUN SERPL-MCNC: 16 MG/DL — SIGNIFICANT CHANGE UP (ref 7–23)
CALCIUM SERPL-MCNC: 9.8 MG/DL — SIGNIFICANT CHANGE UP (ref 8.4–10.5)
CHLORIDE SERPL-SCNC: 100 MMOL/L — SIGNIFICANT CHANGE UP (ref 96–108)
CO2 SERPL-SCNC: 22 MMOL/L — SIGNIFICANT CHANGE UP (ref 22–31)
CREAT SERPL-MCNC: 0.77 MG/DL — SIGNIFICANT CHANGE UP (ref 0.5–1.3)
EGFR: 87 ML/MIN/1.73M2 — SIGNIFICANT CHANGE UP
GLUCOSE SERPL-MCNC: 156 MG/DL — HIGH (ref 70–99)
POTASSIUM SERPL-MCNC: 3.8 MMOL/L — SIGNIFICANT CHANGE UP (ref 3.5–5.3)
POTASSIUM SERPL-SCNC: 3.8 MMOL/L — SIGNIFICANT CHANGE UP (ref 3.5–5.3)
PROT SERPL-MCNC: 7.3 G/DL — SIGNIFICANT CHANGE UP (ref 6–8.3)
SODIUM SERPL-SCNC: 137 MMOL/L — SIGNIFICANT CHANGE UP (ref 135–145)

## 2024-10-04 ENCOUNTER — OUTPATIENT (OUTPATIENT)
Dept: OUTPATIENT SERVICES | Facility: HOSPITAL | Age: 64
LOS: 1 days | Discharge: HOME | End: 2024-10-04

## 2024-10-04 DIAGNOSIS — Z90.722 ACQUIRED ABSENCE OF OVARIES, BILATERAL: Chronic | ICD-10-CM

## 2024-10-04 DIAGNOSIS — C50.919 MALIGNANT NEOPLASM OF UNSPECIFIED SITE OF UNSPECIFIED FEMALE BREAST: ICD-10-CM

## 2024-10-04 DIAGNOSIS — Z98.890 OTHER SPECIFIED POSTPROCEDURAL STATES: Chronic | ICD-10-CM

## 2024-10-07 ENCOUNTER — APPOINTMENT (OUTPATIENT)
Dept: HEMATOLOGY ONCOLOGY | Facility: CLINIC | Age: 64
End: 2024-10-07
Payer: MEDICAID

## 2024-10-07 ENCOUNTER — NON-APPOINTMENT (OUTPATIENT)
Age: 64
End: 2024-10-07

## 2024-10-07 VITALS
BODY MASS INDEX: 28.34 KG/M2 | TEMPERATURE: 97.9 F | DIASTOLIC BLOOD PRESSURE: 74 MMHG | HEART RATE: 67 BPM | HEIGHT: 62 IN | SYSTOLIC BLOOD PRESSURE: 118 MMHG | WEIGHT: 153.99 LBS | OXYGEN SATURATION: 99 %

## 2024-10-07 DIAGNOSIS — C50.412 MALIGNANT NEOPLASM OF UPPER-OUTER QUADRANT OF LEFT FEMALE BREAST: ICD-10-CM

## 2024-10-07 DIAGNOSIS — Z17.0 MALIGNANT NEOPLASM OF UPPER-OUTER QUADRANT OF LEFT FEMALE BREAST: ICD-10-CM

## 2024-10-07 DIAGNOSIS — Z79.899 OTHER LONG TERM (CURRENT) DRUG THERAPY: ICD-10-CM

## 2024-10-07 PROCEDURE — G2211 COMPLEX E/M VISIT ADD ON: CPT | Mod: NC

## 2024-10-07 PROCEDURE — 99214 OFFICE O/P EST MOD 30 MIN: CPT

## 2024-10-17 ENCOUNTER — APPOINTMENT (OUTPATIENT)
Age: 64
End: 2024-10-17

## 2024-10-17 ENCOUNTER — RESULT REVIEW (OUTPATIENT)
Age: 64
End: 2024-10-17

## 2024-10-17 LAB
BASOPHILS # BLD AUTO: 0.1 K/UL — SIGNIFICANT CHANGE UP (ref 0–0.2)
BASOPHILS NFR BLD AUTO: 0.4 % — SIGNIFICANT CHANGE UP (ref 0–2)
EOSINOPHIL # BLD AUTO: 0 K/UL — SIGNIFICANT CHANGE UP (ref 0–0.5)
EOSINOPHIL NFR BLD AUTO: 0.1 % — SIGNIFICANT CHANGE UP (ref 0–6)
HCT VFR BLD CALC: 37 % — SIGNIFICANT CHANGE UP (ref 34.5–45)
HGB BLD-MCNC: 11.5 G/DL — SIGNIFICANT CHANGE UP (ref 11.5–15.5)
LYMPHOCYTES # BLD AUTO: 0.7 K/UL — LOW (ref 1–3.3)
LYMPHOCYTES # BLD AUTO: 4.8 % — LOW (ref 13–44)
MCHC RBC-ENTMCNC: 29 PG — SIGNIFICANT CHANGE UP (ref 27–34)
MCHC RBC-ENTMCNC: 31.1 G/DL — LOW (ref 32–36)
MCV RBC AUTO: 93.4 FL — SIGNIFICANT CHANGE UP (ref 80–100)
MONOCYTES # BLD AUTO: 0.4 K/UL — SIGNIFICANT CHANGE UP (ref 0–0.9)
MONOCYTES NFR BLD AUTO: 2.8 % — SIGNIFICANT CHANGE UP (ref 2–14)
NEUTROPHILS # BLD AUTO: 14.3 K/UL — HIGH (ref 1.8–7.4)
NEUTROPHILS NFR BLD AUTO: 91.9 % — HIGH (ref 43–77)
PLATELET # BLD AUTO: 324 K/UL — SIGNIFICANT CHANGE UP (ref 150–400)
RBC # BLD: 3.96 M/UL — SIGNIFICANT CHANGE UP (ref 3.8–5.2)
RBC # FLD: 13.8 % — SIGNIFICANT CHANGE UP (ref 10.3–14.5)
WBC # BLD: 15.6 K/UL — HIGH (ref 3.8–10.5)
WBC # FLD AUTO: 15.6 K/UL — HIGH (ref 3.8–10.5)

## 2024-10-18 DIAGNOSIS — R11.2 NAUSEA WITH VOMITING, UNSPECIFIED: ICD-10-CM

## 2024-10-18 DIAGNOSIS — Z51.89 ENCOUNTER FOR OTHER SPECIFIED AFTERCARE: ICD-10-CM

## 2024-10-18 DIAGNOSIS — Z51.11 ENCOUNTER FOR ANTINEOPLASTIC CHEMOTHERAPY: ICD-10-CM

## 2024-10-18 LAB
ALBUMIN SERPL ELPH-MCNC: 4.3 G/DL — SIGNIFICANT CHANGE UP (ref 3.3–5)
ALP SERPL-CCNC: 70 U/L — SIGNIFICANT CHANGE UP (ref 40–120)
ALT FLD-CCNC: 14 U/L — SIGNIFICANT CHANGE UP (ref 10–45)
ANION GAP SERPL CALC-SCNC: 13 MMOL/L — SIGNIFICANT CHANGE UP (ref 5–17)
AST SERPL-CCNC: 20 U/L — SIGNIFICANT CHANGE UP (ref 10–40)
BILIRUB SERPL-MCNC: 0.2 MG/DL — SIGNIFICANT CHANGE UP (ref 0.2–1.2)
BUN SERPL-MCNC: 12 MG/DL — SIGNIFICANT CHANGE UP (ref 7–23)
CALCIUM SERPL-MCNC: 10 MG/DL — SIGNIFICANT CHANGE UP (ref 8.4–10.5)
CHLORIDE SERPL-SCNC: 100 MMOL/L — SIGNIFICANT CHANGE UP (ref 96–108)
CO2 SERPL-SCNC: 22 MMOL/L — SIGNIFICANT CHANGE UP (ref 22–31)
CREAT SERPL-MCNC: 0.72 MG/DL — SIGNIFICANT CHANGE UP (ref 0.5–1.3)
EGFR: 94 ML/MIN/1.73M2 — SIGNIFICANT CHANGE UP
GLUCOSE SERPL-MCNC: 101 MG/DL — HIGH (ref 70–99)
POTASSIUM SERPL-MCNC: 4.4 MMOL/L — SIGNIFICANT CHANGE UP (ref 3.5–5.3)
POTASSIUM SERPL-SCNC: 4.4 MMOL/L — SIGNIFICANT CHANGE UP (ref 3.5–5.3)
PROT SERPL-MCNC: 6.9 G/DL — SIGNIFICANT CHANGE UP (ref 6–8.3)
SODIUM SERPL-SCNC: 135 MMOL/L — SIGNIFICANT CHANGE UP (ref 135–145)

## 2024-11-07 ENCOUNTER — OUTPATIENT (OUTPATIENT)
Dept: OUTPATIENT SERVICES | Facility: HOSPITAL | Age: 64
LOS: 1 days | Discharge: HOME | End: 2024-11-07
Payer: MEDICAID

## 2024-11-07 ENCOUNTER — RESULT REVIEW (OUTPATIENT)
Age: 64
End: 2024-11-07

## 2024-11-07 ENCOUNTER — APPOINTMENT (OUTPATIENT)
Dept: HEMATOLOGY ONCOLOGY | Facility: CLINIC | Age: 64
End: 2024-11-07
Payer: MEDICAID

## 2024-11-07 ENCOUNTER — APPOINTMENT (OUTPATIENT)
Age: 64
End: 2024-11-07

## 2024-11-07 DIAGNOSIS — Z98.890 OTHER SPECIFIED POSTPROCEDURAL STATES: Chronic | ICD-10-CM

## 2024-11-07 DIAGNOSIS — Z17.0 MALIGNANT NEOPLASM OF UPPER-OUTER QUADRANT OF LEFT FEMALE BREAST: ICD-10-CM

## 2024-11-07 DIAGNOSIS — Z90.722 ACQUIRED ABSENCE OF OVARIES, BILATERAL: Chronic | ICD-10-CM

## 2024-11-07 DIAGNOSIS — Z79.899 OTHER LONG TERM (CURRENT) DRUG THERAPY: ICD-10-CM

## 2024-11-07 DIAGNOSIS — C50.412 MALIGNANT NEOPLASM OF UPPER-OUTER QUADRANT OF LEFT FEMALE BREAST: ICD-10-CM

## 2024-11-07 LAB
BASOPHILS # BLD AUTO: 0 K/UL — SIGNIFICANT CHANGE UP (ref 0–0.2)
BASOPHILS NFR BLD AUTO: 0.1 % — SIGNIFICANT CHANGE UP (ref 0–2)
EOSINOPHIL # BLD AUTO: 0 K/UL — SIGNIFICANT CHANGE UP (ref 0–0.5)
EOSINOPHIL NFR BLD AUTO: 0 % — SIGNIFICANT CHANGE UP (ref 0–6)
HCT VFR BLD CALC: 35.8 % — SIGNIFICANT CHANGE UP (ref 34.5–45)
HGB BLD-MCNC: 11.3 G/DL — LOW (ref 11.5–15.5)
LYMPHOCYTES # BLD AUTO: 0.8 K/UL — LOW (ref 1–3.3)
LYMPHOCYTES # BLD AUTO: 5.3 % — LOW (ref 13–44)
MCHC RBC-ENTMCNC: 28.6 PG — SIGNIFICANT CHANGE UP (ref 27–34)
MCHC RBC-ENTMCNC: 31.5 G/DL — LOW (ref 32–36)
MCV RBC AUTO: 90.8 FL — SIGNIFICANT CHANGE UP (ref 80–100)
MONOCYTES # BLD AUTO: 0.4 K/UL — SIGNIFICANT CHANGE UP (ref 0–0.9)
MONOCYTES NFR BLD AUTO: 3.1 % — SIGNIFICANT CHANGE UP (ref 2–14)
NEUTROPHILS # BLD AUTO: 12.9 K/UL — HIGH (ref 1.8–7.4)
NEUTROPHILS NFR BLD AUTO: 91.4 % — HIGH (ref 43–77)
PLATELET # BLD AUTO: 302 K/UL — SIGNIFICANT CHANGE UP (ref 150–400)
RBC # BLD: 3.94 M/UL — SIGNIFICANT CHANGE UP (ref 3.8–5.2)
RBC # FLD: 14.3 % — SIGNIFICANT CHANGE UP (ref 10.3–14.5)
WBC # BLD: 14.1 K/UL — HIGH (ref 3.8–10.5)
WBC # FLD AUTO: 14.1 K/UL — HIGH (ref 3.8–10.5)

## 2024-11-07 PROCEDURE — 99214 OFFICE O/P EST MOD 30 MIN: CPT

## 2024-11-07 PROCEDURE — T1013: CPT

## 2024-11-08 LAB
ALBUMIN SERPL ELPH-MCNC: 4.2 G/DL — SIGNIFICANT CHANGE UP (ref 3.3–5)
ALP SERPL-CCNC: 70 U/L — SIGNIFICANT CHANGE UP (ref 40–120)
ALT FLD-CCNC: 41 U/L — SIGNIFICANT CHANGE UP (ref 10–45)
ANION GAP SERPL CALC-SCNC: 12 MMOL/L — SIGNIFICANT CHANGE UP (ref 5–17)
AST SERPL-CCNC: 46 U/L — HIGH (ref 10–40)
BILIRUB SERPL-MCNC: 0.2 MG/DL — SIGNIFICANT CHANGE UP (ref 0.2–1.2)
BUN SERPL-MCNC: 16 MG/DL — SIGNIFICANT CHANGE UP (ref 7–23)
CALCIUM SERPL-MCNC: 9.9 MG/DL — SIGNIFICANT CHANGE UP (ref 8.4–10.5)
CHLORIDE SERPL-SCNC: 101 MMOL/L — SIGNIFICANT CHANGE UP (ref 96–108)
CO2 SERPL-SCNC: 24 MMOL/L — SIGNIFICANT CHANGE UP (ref 22–31)
CREAT SERPL-MCNC: 0.78 MG/DL — SIGNIFICANT CHANGE UP (ref 0.5–1.3)
EGFR: 86 ML/MIN/1.73M2 — SIGNIFICANT CHANGE UP
GLUCOSE SERPL-MCNC: 98 MG/DL — SIGNIFICANT CHANGE UP (ref 70–99)
POTASSIUM SERPL-MCNC: 4.3 MMOL/L — SIGNIFICANT CHANGE UP (ref 3.5–5.3)
POTASSIUM SERPL-SCNC: 4.3 MMOL/L — SIGNIFICANT CHANGE UP (ref 3.5–5.3)
PROT SERPL-MCNC: 6.8 G/DL — SIGNIFICANT CHANGE UP (ref 6–8.3)
SODIUM SERPL-SCNC: 137 MMOL/L — SIGNIFICANT CHANGE UP (ref 135–145)

## 2024-11-26 ENCOUNTER — APPOINTMENT (OUTPATIENT)
Dept: RADIATION ONCOLOGY | Facility: CLINIC | Age: 64
End: 2024-11-26
Payer: MEDICAID

## 2024-11-26 ENCOUNTER — NON-APPOINTMENT (OUTPATIENT)
Age: 64
End: 2024-11-26

## 2024-11-26 VITALS
RESPIRATION RATE: 16 BRPM | DIASTOLIC BLOOD PRESSURE: 76 MMHG | TEMPERATURE: 98.4 F | SYSTOLIC BLOOD PRESSURE: 128 MMHG | WEIGHT: 163 LBS | BODY MASS INDEX: 30 KG/M2 | HEART RATE: 85 BPM | HEIGHT: 62 IN | OXYGEN SATURATION: 99 %

## 2024-11-26 DIAGNOSIS — C50.412 MALIGNANT NEOPLASM OF UPPER-OUTER QUADRANT OF LEFT FEMALE BREAST: ICD-10-CM

## 2024-11-26 DIAGNOSIS — Z17.0 MALIGNANT NEOPLASM OF UPPER-OUTER QUADRANT OF LEFT FEMALE BREAST: ICD-10-CM

## 2024-11-26 PROCEDURE — G2211 COMPLEX E/M VISIT ADD ON: CPT | Mod: NC

## 2024-11-26 PROCEDURE — 99205 OFFICE O/P NEW HI 60 MIN: CPT

## 2024-11-26 PROCEDURE — T1013A: CUSTOM

## 2024-11-29 ENCOUNTER — RESULT REVIEW (OUTPATIENT)
Age: 64
End: 2024-11-29

## 2024-11-29 ENCOUNTER — APPOINTMENT (OUTPATIENT)
Age: 64
End: 2024-11-29

## 2024-11-29 LAB
ALBUMIN SERPL ELPH-MCNC: 4 G/DL — SIGNIFICANT CHANGE UP (ref 3.3–5)
ALP SERPL-CCNC: 74 U/L — SIGNIFICANT CHANGE UP (ref 40–120)
ALT FLD-CCNC: 28 U/L — SIGNIFICANT CHANGE UP (ref 10–45)
ANION GAP SERPL CALC-SCNC: 13 MMOL/L — SIGNIFICANT CHANGE UP (ref 5–17)
AST SERPL-CCNC: 27 U/L — SIGNIFICANT CHANGE UP (ref 10–40)
BASOPHILS # BLD AUTO: 0 K/UL — SIGNIFICANT CHANGE UP (ref 0–0.2)
BASOPHILS NFR BLD AUTO: 0.2 % — SIGNIFICANT CHANGE UP (ref 0–2)
BILIRUB SERPL-MCNC: 0.3 MG/DL — SIGNIFICANT CHANGE UP (ref 0.2–1.2)
BUN SERPL-MCNC: 22 MG/DL — SIGNIFICANT CHANGE UP (ref 7–23)
CALCIUM SERPL-MCNC: 9.9 MG/DL — SIGNIFICANT CHANGE UP (ref 8.4–10.5)
CHLORIDE SERPL-SCNC: 102 MMOL/L — SIGNIFICANT CHANGE UP (ref 96–108)
CO2 SERPL-SCNC: 24 MMOL/L — SIGNIFICANT CHANGE UP (ref 22–31)
CREAT SERPL-MCNC: 0.74 MG/DL — SIGNIFICANT CHANGE UP (ref 0.5–1.3)
EGFR: 91 ML/MIN/1.73M2 — SIGNIFICANT CHANGE UP
EOSINOPHIL # BLD AUTO: 0 K/UL — SIGNIFICANT CHANGE UP (ref 0–0.5)
EOSINOPHIL NFR BLD AUTO: 0.1 % — SIGNIFICANT CHANGE UP (ref 0–6)
GLUCOSE SERPL-MCNC: 111 MG/DL — HIGH (ref 70–99)
HCT VFR BLD CALC: 34.8 % — SIGNIFICANT CHANGE UP (ref 34.5–45)
HGB BLD-MCNC: 11.3 G/DL — LOW (ref 11.5–15.5)
LYMPHOCYTES # BLD AUTO: 1 K/UL — SIGNIFICANT CHANGE UP (ref 1–3.3)
LYMPHOCYTES # BLD AUTO: 8.1 % — LOW (ref 13–44)
MCHC RBC-ENTMCNC: 28.8 PG — SIGNIFICANT CHANGE UP (ref 27–34)
MCHC RBC-ENTMCNC: 32.4 G/DL — SIGNIFICANT CHANGE UP (ref 32–36)
MCV RBC AUTO: 88.8 FL — SIGNIFICANT CHANGE UP (ref 80–100)
MONOCYTES # BLD AUTO: 0.3 K/UL — SIGNIFICANT CHANGE UP (ref 0–0.9)
MONOCYTES NFR BLD AUTO: 2.7 % — SIGNIFICANT CHANGE UP (ref 2–14)
NEUTROPHILS # BLD AUTO: 10.9 K/UL — HIGH (ref 1.8–7.4)
NEUTROPHILS NFR BLD AUTO: 88.9 % — HIGH (ref 43–77)
PLATELET # BLD AUTO: 262 K/UL — SIGNIFICANT CHANGE UP (ref 150–400)
POTASSIUM SERPL-MCNC: 4.5 MMOL/L — SIGNIFICANT CHANGE UP (ref 3.5–5.3)
POTASSIUM SERPL-SCNC: 4.5 MMOL/L — SIGNIFICANT CHANGE UP (ref 3.5–5.3)
PROT SERPL-MCNC: 6.7 G/DL — SIGNIFICANT CHANGE UP (ref 6–8.3)
RBC # BLD: 3.92 M/UL — SIGNIFICANT CHANGE UP (ref 3.8–5.2)
RBC # FLD: 15.2 % — HIGH (ref 10.3–14.5)
SODIUM SERPL-SCNC: 138 MMOL/L — SIGNIFICANT CHANGE UP (ref 135–145)
WBC # BLD: 12.2 K/UL — HIGH (ref 3.8–10.5)
WBC # FLD AUTO: 12.2 K/UL — HIGH (ref 3.8–10.5)

## 2024-12-03 DIAGNOSIS — C50.412 MALIGNANT NEOPLASM OF UPPER-OUTER QUADRANT OF LEFT FEMALE BREAST: ICD-10-CM

## 2024-12-04 PROCEDURE — 77263 THER RADIOLOGY TX PLNG CPLX: CPT

## 2024-12-04 PROCEDURE — 77290 THER RAD SIMULAJ FIELD CPLX: CPT | Mod: 26

## 2024-12-04 PROCEDURE — 77333 RADIATION TREATMENT AID(S): CPT | Mod: 26

## 2024-12-06 ENCOUNTER — OUTPATIENT (OUTPATIENT)
Dept: OUTPATIENT SERVICES | Facility: HOSPITAL | Age: 64
LOS: 1 days | Discharge: HOME | End: 2024-12-06

## 2024-12-06 DIAGNOSIS — Z90.722 ACQUIRED ABSENCE OF OVARIES, BILATERAL: Chronic | ICD-10-CM

## 2024-12-06 DIAGNOSIS — C50.919 MALIGNANT NEOPLASM OF UNSPECIFIED SITE OF UNSPECIFIED FEMALE BREAST: ICD-10-CM

## 2024-12-06 DIAGNOSIS — Z98.890 OTHER SPECIFIED POSTPROCEDURAL STATES: Chronic | ICD-10-CM

## 2024-12-10 PROCEDURE — 77295 3-D RADIOTHERAPY PLAN: CPT | Mod: 26

## 2024-12-10 PROCEDURE — 77334 RADIATION TREATMENT AID(S): CPT | Mod: 26

## 2024-12-10 PROCEDURE — 77300 RADIATION THERAPY DOSE PLAN: CPT | Mod: 26

## 2024-12-11 ENCOUNTER — APPOINTMENT (OUTPATIENT)
Dept: HEMATOLOGY ONCOLOGY | Facility: CLINIC | Age: 64
End: 2024-12-11
Payer: MEDICAID

## 2024-12-11 VITALS
WEIGHT: 160 LBS | TEMPERATURE: 98.8 F | OXYGEN SATURATION: 100 % | BODY MASS INDEX: 29.44 KG/M2 | SYSTOLIC BLOOD PRESSURE: 121 MMHG | HEIGHT: 62 IN | HEART RATE: 80 BPM | DIASTOLIC BLOOD PRESSURE: 72 MMHG

## 2024-12-11 DIAGNOSIS — C50.412 MALIGNANT NEOPLASM OF UPPER-OUTER QUADRANT OF LEFT FEMALE BREAST: ICD-10-CM

## 2024-12-11 DIAGNOSIS — Z17.0 MALIGNANT NEOPLASM OF UPPER-OUTER QUADRANT OF LEFT FEMALE BREAST: ICD-10-CM

## 2024-12-11 DIAGNOSIS — Z79.899 OTHER LONG TERM (CURRENT) DRUG THERAPY: ICD-10-CM

## 2024-12-11 PROCEDURE — 99214 OFFICE O/P EST MOD 30 MIN: CPT

## 2024-12-11 PROCEDURE — G2211 COMPLEX E/M VISIT ADD ON: CPT | Mod: NC

## 2024-12-11 RX ORDER — CHOLECALCIFEROL (VITAMIN D3) 125 MCG
50 MCG TABLET ORAL
Qty: 90 | Refills: 3 | Status: ACTIVE | COMMUNITY
Start: 2024-12-11 | End: 1900-01-01

## 2024-12-11 RX ORDER — ANASTROZOLE TABLETS 1 MG/1
1 TABLET ORAL
Qty: 90 | Refills: 3 | Status: ACTIVE | COMMUNITY
Start: 2024-12-11 | End: 1900-01-01

## 2024-12-17 ENCOUNTER — NON-APPOINTMENT (OUTPATIENT)
Age: 64
End: 2024-12-17

## 2024-12-17 PROCEDURE — 77280 THER RAD SIMULAJ FIELD SMPL: CPT | Mod: 26

## 2024-12-18 PROCEDURE — 77427 RADIATION TX MANAGEMENT X5: CPT

## 2024-12-18 PROCEDURE — G6017: CPT

## 2024-12-19 ENCOUNTER — NON-APPOINTMENT (OUTPATIENT)
Age: 64
End: 2024-12-19

## 2024-12-19 VITALS
HEIGHT: 62 IN | RESPIRATION RATE: 16 BRPM | BODY MASS INDEX: 29.63 KG/M2 | SYSTOLIC BLOOD PRESSURE: 127 MMHG | DIASTOLIC BLOOD PRESSURE: 86 MMHG | OXYGEN SATURATION: 96 % | HEART RATE: 86 BPM | WEIGHT: 161 LBS

## 2024-12-19 PROCEDURE — G6017: CPT

## 2024-12-20 PROCEDURE — G6017: CPT

## 2024-12-23 ENCOUNTER — NON-APPOINTMENT (OUTPATIENT)
Age: 64
End: 2024-12-23

## 2024-12-23 VITALS
RESPIRATION RATE: 16 BRPM | OXYGEN SATURATION: 97 % | DIASTOLIC BLOOD PRESSURE: 72 MMHG | SYSTOLIC BLOOD PRESSURE: 129 MMHG | BODY MASS INDEX: 29.63 KG/M2 | HEART RATE: 82 BPM | WEIGHT: 161 LBS | HEIGHT: 62 IN

## 2024-12-23 PROCEDURE — G6017: CPT

## 2024-12-26 PROCEDURE — 77427 RADIATION TX MANAGEMENT X5: CPT

## 2024-12-26 PROCEDURE — G6017: CPT

## 2024-12-27 PROCEDURE — G6017: CPT

## 2024-12-30 PROCEDURE — G6017: CPT

## 2024-12-31 PROCEDURE — G6017: CPT

## 2025-01-02 ENCOUNTER — NON-APPOINTMENT (OUTPATIENT)
Age: 65
End: 2025-01-02

## 2025-01-02 VITALS
WEIGHT: 159 LBS | OXYGEN SATURATION: 97 % | DIASTOLIC BLOOD PRESSURE: 70 MMHG | HEART RATE: 81 BPM | RESPIRATION RATE: 16 BRPM | HEIGHT: 62 IN | BODY MASS INDEX: 29.26 KG/M2 | SYSTOLIC BLOOD PRESSURE: 135 MMHG

## 2025-01-02 PROCEDURE — G6017: CPT

## 2025-01-03 PROCEDURE — 77427 RADIATION TX MANAGEMENT X5: CPT

## 2025-01-03 PROCEDURE — G6017: CPT

## 2025-01-06 PROCEDURE — G6002: CPT | Mod: 26

## 2025-01-06 PROCEDURE — G6017: CPT

## 2025-01-07 PROCEDURE — G6017: CPT

## 2025-01-07 PROCEDURE — 77280 THER RAD SIMULAJ FIELD SMPL: CPT | Mod: 26

## 2025-01-08 PROCEDURE — G6017: CPT

## 2025-01-09 ENCOUNTER — APPOINTMENT (OUTPATIENT)
Dept: PHYSICAL MEDICINE AND REHAB | Facility: CLINIC | Age: 65
End: 2025-01-09

## 2025-01-09 ENCOUNTER — NON-APPOINTMENT (OUTPATIENT)
Age: 65
End: 2025-01-09

## 2025-01-09 VITALS
SYSTOLIC BLOOD PRESSURE: 123 MMHG | RESPIRATION RATE: 16 BRPM | HEART RATE: 83 BPM | WEIGHT: 157 LBS | OXYGEN SATURATION: 99 % | HEIGHT: 62 IN | BODY MASS INDEX: 28.89 KG/M2 | DIASTOLIC BLOOD PRESSURE: 73 MMHG

## 2025-01-09 PROCEDURE — G6017: CPT

## 2025-01-10 PROCEDURE — G6017: CPT

## 2025-01-10 PROCEDURE — 77427 RADIATION TX MANAGEMENT X5: CPT

## 2025-01-15 ENCOUNTER — NON-APPOINTMENT (OUTPATIENT)
Age: 65
End: 2025-01-15

## 2025-01-15 VITALS
SYSTOLIC BLOOD PRESSURE: 127 MMHG | HEIGHT: 62 IN | DIASTOLIC BLOOD PRESSURE: 73 MMHG | OXYGEN SATURATION: 98 % | RESPIRATION RATE: 15 BRPM | WEIGHT: 156 LBS | HEART RATE: 68 BPM | TEMPERATURE: 97.7 F | BODY MASS INDEX: 28.71 KG/M2

## 2025-02-06 ENCOUNTER — APPOINTMENT (OUTPATIENT)
Dept: BREAST CENTER | Facility: CLINIC | Age: 65
End: 2025-02-06

## 2025-02-20 ENCOUNTER — APPOINTMENT (OUTPATIENT)
Dept: BREAST CENTER | Facility: CLINIC | Age: 65
End: 2025-02-20

## 2025-02-20 VITALS
HEART RATE: 67 BPM | WEIGHT: 157 LBS | SYSTOLIC BLOOD PRESSURE: 116 MMHG | HEIGHT: 62 IN | DIASTOLIC BLOOD PRESSURE: 78 MMHG | BODY MASS INDEX: 28.89 KG/M2 | RESPIRATION RATE: 16 BRPM

## 2025-02-20 DIAGNOSIS — C50.412 MALIGNANT NEOPLASM OF UPPER-OUTER QUADRANT OF LEFT FEMALE BREAST: ICD-10-CM

## 2025-02-20 DIAGNOSIS — Z17.0 MALIGNANT NEOPLASM OF UPPER-OUTER QUADRANT OF LEFT FEMALE BREAST: ICD-10-CM

## 2025-02-20 PROCEDURE — 99213 OFFICE O/P EST LOW 20 MIN: CPT

## 2025-02-24 ENCOUNTER — NON-APPOINTMENT (OUTPATIENT)
Age: 65
End: 2025-02-24

## 2025-03-17 ENCOUNTER — APPOINTMENT (OUTPATIENT)
Dept: RADIATION ONCOLOGY | Facility: CLINIC | Age: 65
End: 2025-03-17
Payer: MEDICAID

## 2025-03-17 VITALS
DIASTOLIC BLOOD PRESSURE: 71 MMHG | HEIGHT: 72 IN | SYSTOLIC BLOOD PRESSURE: 154 MMHG | HEART RATE: 75 BPM | OXYGEN SATURATION: 99 % | WEIGHT: 158 LBS | RESPIRATION RATE: 16 BRPM | BODY MASS INDEX: 21.4 KG/M2

## 2025-03-17 PROCEDURE — 99024 POSTOP FOLLOW-UP VISIT: CPT

## 2025-04-02 ENCOUNTER — OUTPATIENT (OUTPATIENT)
Dept: OUTPATIENT SERVICES | Facility: HOSPITAL | Age: 65
LOS: 1 days | Discharge: HOME | End: 2025-04-02

## 2025-04-02 DIAGNOSIS — Z90.722 ACQUIRED ABSENCE OF OVARIES, BILATERAL: Chronic | ICD-10-CM

## 2025-04-02 DIAGNOSIS — Z98.890 OTHER SPECIFIED POSTPROCEDURAL STATES: Chronic | ICD-10-CM

## 2025-04-09 ENCOUNTER — APPOINTMENT (OUTPATIENT)
Dept: HEMATOLOGY ONCOLOGY | Facility: CLINIC | Age: 65
End: 2025-04-09
Payer: MEDICAID

## 2025-04-09 ENCOUNTER — NON-APPOINTMENT (OUTPATIENT)
Age: 65
End: 2025-04-09

## 2025-04-09 VITALS
HEART RATE: 68 BPM | DIASTOLIC BLOOD PRESSURE: 79 MMHG | TEMPERATURE: 98 F | HEIGHT: 62.72 IN | SYSTOLIC BLOOD PRESSURE: 131 MMHG | BODY MASS INDEX: 28.06 KG/M2 | OXYGEN SATURATION: 100 % | WEIGHT: 156.42 LBS

## 2025-04-09 DIAGNOSIS — Z17.0 MALIGNANT NEOPLASM OF UPPER-OUTER QUADRANT OF LEFT FEMALE BREAST: ICD-10-CM

## 2025-04-09 DIAGNOSIS — Z79.811 LONG TERM (CURRENT) USE OF AROMATASE INHIBITORS: ICD-10-CM

## 2025-04-09 DIAGNOSIS — C50.412 MALIGNANT NEOPLASM OF UPPER-OUTER QUADRANT OF LEFT FEMALE BREAST: ICD-10-CM

## 2025-04-09 PROCEDURE — 99214 OFFICE O/P EST MOD 30 MIN: CPT

## 2025-04-09 PROCEDURE — G2211 COMPLEX E/M VISIT ADD ON: CPT | Mod: NC

## 2025-05-13 ENCOUNTER — OFFICE (OUTPATIENT)
Dept: URBAN - METROPOLITAN AREA CLINIC 63 | Facility: CLINIC | Age: 65
Setting detail: OPHTHALMOLOGY
End: 2025-05-13
Payer: COMMERCIAL

## 2025-05-13 DIAGNOSIS — H35.033: ICD-10-CM

## 2025-05-13 DIAGNOSIS — H01.004: ICD-10-CM

## 2025-05-13 DIAGNOSIS — H01.001: ICD-10-CM

## 2025-05-13 DIAGNOSIS — H25.813: ICD-10-CM

## 2025-05-13 DIAGNOSIS — H52.4: ICD-10-CM

## 2025-05-13 PROBLEM — H02.831 DERMATOCHALASIS; RIGHT UPPER LID, LEFT UPPER LID: Status: ACTIVE | Noted: 2025-05-13

## 2025-05-13 PROBLEM — H02.834 DERMATOCHALASIS; RIGHT UPPER LID, LEFT UPPER LID: Status: ACTIVE | Noted: 2025-05-13

## 2025-05-13 PROCEDURE — 92015 DETERMINE REFRACTIVE STATE: CPT | Performed by: INTERNAL MEDICINE

## 2025-05-13 PROCEDURE — 92014 COMPRE OPH EXAM EST PT 1/>: CPT | Performed by: INTERNAL MEDICINE

## 2025-05-13 ASSESSMENT — REFRACTION_CURRENTRX
OS_SPHERE: +3.25
OS_CYLINDER: 0.00
OD_AXIS: 180
OD_ADD: +2.50
OD_VPRISM_DIRECTION: PROGS
OS_OVR_VA: 20/
OD_OVR_VA: 20/
OS_VPRISM_DIRECTION: PROGS
OS_ADD: +2.50
OD_CYLINDER: 0.00
OD_SPHERE: +3.25
OS_AXIS: 180

## 2025-05-13 ASSESSMENT — KERATOMETRY
OD_K1POWER_DIOPTERS: 44.25
OS_K2POWER_DIOPTERS: 44.50
OS_K1POWER_DIOPTERS: 44.00
OD_K2POWER_DIOPTERS: 44.75
OD_AXISANGLE_DEGREES: 013
OS_AXISANGLE_DEGREES: 148

## 2025-05-13 ASSESSMENT — LID EXAM ASSESSMENTS
OD_MEIBOMITIS: 1+
OS_DERMATOCHALASIS: 1+
OD_BLEPHARITIS: RUL T
OS_MEIBOMITIS: 1+
OD_DERMATOCHALASIS: 1+
OS_BLEPHARITIS: LUL T

## 2025-05-13 ASSESSMENT — CONFRONTATIONAL VISUAL FIELD TEST (CVF)
OD_FINDINGS: FULL
OS_FINDINGS: FULL

## 2025-05-13 ASSESSMENT — VISUAL ACUITY
OD_BCVA: 20/30
OS_BCVA: 20/30-1

## 2025-05-13 ASSESSMENT — REFRACTION_MANIFEST
OS_AXIS: 070
OS_SPHERE: +3.75
OU_VA: 20/25
OS_CYLINDER: -0.75
OD_SPHERE: +3.75
OD_AXIS: 125
OD_VA1: 20/25
OS_VA1: 20/25
OD_CYLINDER: -0.25
OD_ADD: +2.75
OS_ADD: +2.75

## 2025-05-13 ASSESSMENT — REFRACTION_AUTOREFRACTION
OD_AXIS: 124
OD_SPHERE: +3.75
OS_SPHERE: +4.50
OS_AXIS: 068
OS_CYLINDER: -0.75
OD_CYLINDER: -0.25

## 2025-05-13 ASSESSMENT — TONOMETRY
OS_IOP_MMHG: 18
OD_IOP_MMHG: 18

## 2025-05-13 ASSESSMENT — LID POSITION - DERMATOCHALASIS
OS_DERMATOCHALASIS: LUL 1+
OD_DERMATOCHALASIS: RUL 1+

## 2025-05-13 ASSESSMENT — SUPERFICIAL PUNCTATE KERATITIS (SPK): OD_SPK: 1+

## 2025-06-17 ENCOUNTER — APPOINTMENT (OUTPATIENT)
Dept: MAMMOGRAPHY | Facility: CLINIC | Age: 65
End: 2025-06-17
Payer: MEDICAID

## 2025-06-17 ENCOUNTER — NON-APPOINTMENT (OUTPATIENT)
Age: 65
End: 2025-06-17

## 2025-06-17 ENCOUNTER — APPOINTMENT (OUTPATIENT)
Dept: ULTRASOUND IMAGING | Facility: CLINIC | Age: 65
End: 2025-06-17
Payer: MEDICAID

## 2025-06-17 ENCOUNTER — RESULT REVIEW (OUTPATIENT)
Age: 65
End: 2025-06-17

## 2025-06-17 ENCOUNTER — OUTPATIENT (OUTPATIENT)
Dept: OUTPATIENT SERVICES | Facility: HOSPITAL | Age: 65
LOS: 1 days | End: 2025-06-17
Payer: COMMERCIAL

## 2025-06-17 DIAGNOSIS — Z90.722 ACQUIRED ABSENCE OF OVARIES, BILATERAL: Chronic | ICD-10-CM

## 2025-06-17 DIAGNOSIS — C50.412 MALIGNANT NEOPLASM OF UPPER-OUTER QUADRANT OF LEFT FEMALE BREAST: ICD-10-CM

## 2025-06-17 DIAGNOSIS — Z98.890 OTHER SPECIFIED POSTPROCEDURAL STATES: Chronic | ICD-10-CM

## 2025-06-17 PROCEDURE — 76641 ULTRASOUND BREAST COMPLETE: CPT | Mod: 26,50

## 2025-06-17 PROCEDURE — 77066 DX MAMMO INCL CAD BI: CPT

## 2025-06-17 PROCEDURE — G0279: CPT | Mod: 26

## 2025-06-17 PROCEDURE — 77066 DX MAMMO INCL CAD BI: CPT | Mod: 26

## 2025-06-17 PROCEDURE — 76641 ULTRASOUND BREAST COMPLETE: CPT

## 2025-06-17 PROCEDURE — G0279: CPT

## 2025-06-24 ENCOUNTER — NON-APPOINTMENT (OUTPATIENT)
Age: 65
End: 2025-06-24

## 2025-06-24 ENCOUNTER — APPOINTMENT (OUTPATIENT)
Dept: RADIATION ONCOLOGY | Facility: CLINIC | Age: 65
End: 2025-06-24
Payer: MEDICAID

## 2025-06-24 VITALS
RESPIRATION RATE: 16 BRPM | SYSTOLIC BLOOD PRESSURE: 123 MMHG | OXYGEN SATURATION: 97 % | DIASTOLIC BLOOD PRESSURE: 77 MMHG | BODY MASS INDEX: 27.35 KG/M2 | WEIGHT: 153 LBS | HEART RATE: 75 BPM

## 2025-06-24 PROCEDURE — T1013A: CUSTOM

## 2025-06-24 PROCEDURE — G2211 COMPLEX E/M VISIT ADD ON: CPT | Mod: NC

## 2025-06-24 PROCEDURE — 99214 OFFICE O/P EST MOD 30 MIN: CPT

## 2025-06-27 ENCOUNTER — OUTPATIENT (OUTPATIENT)
Dept: OUTPATIENT SERVICES | Facility: HOSPITAL | Age: 65
LOS: 1 days | Discharge: HOME | End: 2025-06-27

## 2025-06-27 DIAGNOSIS — Z90.722 ACQUIRED ABSENCE OF OVARIES, BILATERAL: Chronic | ICD-10-CM

## 2025-06-27 DIAGNOSIS — Z98.890 OTHER SPECIFIED POSTPROCEDURAL STATES: Chronic | ICD-10-CM

## 2025-06-27 DIAGNOSIS — C50.919 MALIGNANT NEOPLASM OF UNSPECIFIED SITE OF UNSPECIFIED FEMALE BREAST: ICD-10-CM

## 2025-07-07 ENCOUNTER — APPOINTMENT (OUTPATIENT)
Dept: HEMATOLOGY ONCOLOGY | Facility: CLINIC | Age: 65
End: 2025-07-07

## 2025-07-07 ENCOUNTER — APPOINTMENT (OUTPATIENT)
Dept: HEMATOLOGY ONCOLOGY | Facility: CLINIC | Age: 65
End: 2025-07-07
Payer: MEDICAID

## 2025-07-07 VITALS
TEMPERATURE: 97.2 F | OXYGEN SATURATION: 96 % | BODY MASS INDEX: 27.1 KG/M2 | WEIGHT: 151.06 LBS | HEIGHT: 62.72 IN | HEART RATE: 65 BPM | SYSTOLIC BLOOD PRESSURE: 130 MMHG | DIASTOLIC BLOOD PRESSURE: 70 MMHG

## 2025-07-07 PROCEDURE — 99214 OFFICE O/P EST MOD 30 MIN: CPT

## 2025-07-08 ENCOUNTER — APPOINTMENT (OUTPATIENT)
Dept: PHYSICAL MEDICINE AND REHAB | Facility: CLINIC | Age: 65
End: 2025-07-08
Payer: MEDICAID

## 2025-07-08 VITALS
WEIGHT: 150 LBS | HEIGHT: 62 IN | SYSTOLIC BLOOD PRESSURE: 130 MMHG | RESPIRATION RATE: 14 BRPM | HEART RATE: 61 BPM | BODY MASS INDEX: 27.6 KG/M2 | DIASTOLIC BLOOD PRESSURE: 77 MMHG

## 2025-07-08 PROBLEM — M79.2 NEUROPATHIC PAIN: Status: ACTIVE | Noted: 2025-07-08

## 2025-07-08 PROBLEM — Z85.3 HISTORY OF BREAST CANCER: Status: RESOLVED | Noted: 2025-07-08 | Resolved: 2025-07-08

## 2025-07-08 PROBLEM — Z86.39 HISTORY OF HIGH CHOLESTEROL: Status: RESOLVED | Noted: 2025-07-08 | Resolved: 2025-07-08

## 2025-07-08 PROCEDURE — 99204 OFFICE O/P NEW MOD 45 MIN: CPT

## 2025-07-08 RX ORDER — DULOXETINE HYDROCHLORIDE 20 MG/1
20 CAPSULE, DELAYED RELEASE PELLETS ORAL
Qty: 30 | Refills: 1 | Status: ACTIVE | COMMUNITY
Start: 2025-07-08 | End: 1900-01-01

## 2025-07-14 ENCOUNTER — RESULT REVIEW (OUTPATIENT)
Age: 65
End: 2025-07-14

## 2025-07-14 ENCOUNTER — APPOINTMENT (OUTPATIENT)
Dept: HEMATOLOGY ONCOLOGY | Facility: CLINIC | Age: 65
End: 2025-07-14

## 2025-07-14 LAB
BASOPHILS # BLD AUTO: 0.03 K/UL — SIGNIFICANT CHANGE UP (ref 0–0.2)
BASOPHILS NFR BLD AUTO: 0.7 % — SIGNIFICANT CHANGE UP (ref 0–2)
EOSINOPHIL # BLD AUTO: 0.09 K/UL — SIGNIFICANT CHANGE UP (ref 0–0.5)
EOSINOPHIL NFR BLD AUTO: 2.2 % — SIGNIFICANT CHANGE UP (ref 0–6)
HCT VFR BLD CALC: 39.1 % — SIGNIFICANT CHANGE UP (ref 34.5–45)
HGB BLD-MCNC: 12.4 G/DL — SIGNIFICANT CHANGE UP (ref 11.5–15.5)
IMM GRANULOCYTES # BLD AUTO: 0.01 K/UL — SIGNIFICANT CHANGE UP (ref 0–0.07)
IMM GRANULOCYTES NFR BLD AUTO: 0.2 % — SIGNIFICANT CHANGE UP (ref 0–0.9)
LYMPHOCYTES # BLD AUTO: 0.99 K/UL — LOW (ref 1–3.3)
LYMPHOCYTES NFR BLD AUTO: 24.3 % — SIGNIFICANT CHANGE UP (ref 13–44)
MCHC RBC-ENTMCNC: 28.2 PG — SIGNIFICANT CHANGE UP (ref 27–34)
MCHC RBC-ENTMCNC: 31.7 G/DL — LOW (ref 32–36)
MCV RBC AUTO: 88.9 FL — SIGNIFICANT CHANGE UP (ref 80–100)
MONOCYTES # BLD AUTO: 0.41 K/UL — SIGNIFICANT CHANGE UP (ref 0–0.9)
MONOCYTES NFR BLD AUTO: 10 % — SIGNIFICANT CHANGE UP (ref 2–14)
NEUTROPHILS # BLD AUTO: 2.55 K/UL — SIGNIFICANT CHANGE UP (ref 1.8–7.4)
NEUTROPHILS NFR BLD AUTO: 62.6 % — SIGNIFICANT CHANGE UP (ref 43–77)
NRBC # BLD AUTO: 0 K/UL — SIGNIFICANT CHANGE UP (ref 0–0)
NRBC # FLD: 0 K/UL — SIGNIFICANT CHANGE UP (ref 0–0)
NRBC BLD AUTO-RTO: 0 /100 WBCS — SIGNIFICANT CHANGE UP (ref 0–0)
PLATELET # BLD AUTO: 187 K/UL — SIGNIFICANT CHANGE UP (ref 150–400)
PMV BLD: 10.8 FL — SIGNIFICANT CHANGE UP (ref 7–13)
RBC # BLD: 4.4 M/UL — SIGNIFICANT CHANGE UP (ref 3.8–5.2)
RBC # FLD: 12.6 % — SIGNIFICANT CHANGE UP (ref 10.3–14.5)
WBC # BLD: 4.08 K/UL — SIGNIFICANT CHANGE UP (ref 3.8–10.5)
WBC # FLD AUTO: 4.08 K/UL — SIGNIFICANT CHANGE UP (ref 3.8–10.5)

## 2025-07-17 LAB
ALBUMIN SERPL ELPH-MCNC: 4.5 G/DL
ALP BLD-CCNC: 100 U/L
ALT SERPL-CCNC: 13 U/L
ANION GAP SERPL CALC-SCNC: 14 MMOL/L
APTT BLD: 43.9 SEC
AST SERPL-CCNC: 17 U/L
BILIRUB SERPL-MCNC: 0.4 MG/DL
BUN SERPL-MCNC: 21 MG/DL
CALCIUM SERPL-MCNC: 9.8 MG/DL
CHLORIDE SERPL-SCNC: 102 MMOL/L
CO2 SERPL-SCNC: 24 MMOL/L
CREAT SERPL-MCNC: 0.89 MG/DL
EGFRCR SERPLBLD CKD-EPI 2021: 73 ML/MIN/1.73M2
GLUCOSE SERPL-MCNC: 74 MG/DL
INR PPP: 1.09 RATIO
POTASSIUM SERPL-SCNC: 4.4 MMOL/L
PROT SERPL-MCNC: 6.9 G/DL
PT BLD: 12.8 SEC
SODIUM SERPL-SCNC: 140 MMOL/L

## 2025-08-06 ENCOUNTER — OUTPATIENT (OUTPATIENT)
Dept: OUTPATIENT SERVICES | Facility: HOSPITAL | Age: 65
LOS: 1 days | End: 2025-08-06

## 2025-08-06 ENCOUNTER — APPOINTMENT (OUTPATIENT)
Dept: INTERVENTIONAL RADIOLOGY/VASCULAR | Facility: CLINIC | Age: 65
End: 2025-08-06
Payer: MEDICAID

## 2025-08-06 VITALS
SYSTOLIC BLOOD PRESSURE: 144 MMHG | TEMPERATURE: 97.8 F | HEART RATE: 61 BPM | OXYGEN SATURATION: 99 % | DIASTOLIC BLOOD PRESSURE: 73 MMHG | RESPIRATION RATE: 16 BRPM

## 2025-08-06 DIAGNOSIS — Z90.722 ACQUIRED ABSENCE OF OVARIES, BILATERAL: Chronic | ICD-10-CM

## 2025-08-06 DIAGNOSIS — C50.412 MALIGNANT NEOPLASM OF UPPER-OUTER QUADRANT OF LEFT FEMALE BREAST: ICD-10-CM

## 2025-08-06 DIAGNOSIS — Z98.890 OTHER SPECIFIED POSTPROCEDURAL STATES: Chronic | ICD-10-CM

## 2025-08-06 PROCEDURE — 36590 REMOVAL TUNNELED CV CATH: CPT

## 2025-08-12 ENCOUNTER — APPOINTMENT (OUTPATIENT)
Dept: PHYSICAL MEDICINE AND REHAB | Facility: CLINIC | Age: 65
End: 2025-08-12
Payer: MEDICAID

## 2025-08-12 VITALS
SYSTOLIC BLOOD PRESSURE: 135 MMHG | HEIGHT: 62 IN | DIASTOLIC BLOOD PRESSURE: 71 MMHG | WEIGHT: 150 LBS | RESPIRATION RATE: 14 BRPM | HEART RATE: 69 BPM | BODY MASS INDEX: 27.6 KG/M2

## 2025-08-12 DIAGNOSIS — M25.50 PAIN IN UNSPECIFIED JOINT: ICD-10-CM

## 2025-08-12 DIAGNOSIS — Z91.89 OTHER SPECIFIED PERSONAL RISK FACTORS, NOT ELSEWHERE CLASSIFIED: ICD-10-CM

## 2025-08-12 DIAGNOSIS — T45.1X5A PAIN IN UNSPECIFIED JOINT: ICD-10-CM

## 2025-08-12 PROCEDURE — 99214 OFFICE O/P EST MOD 30 MIN: CPT

## 2025-08-19 ENCOUNTER — APPOINTMENT (OUTPATIENT)
Dept: BREAST CENTER | Facility: CLINIC | Age: 65
End: 2025-08-19
Payer: MEDICAID

## 2025-08-19 DIAGNOSIS — Z17.0 MALIGNANT NEOPLASM OF UPPER-OUTER QUADRANT OF LEFT FEMALE BREAST: ICD-10-CM

## 2025-08-19 DIAGNOSIS — C50.412 MALIGNANT NEOPLASM OF UPPER-OUTER QUADRANT OF LEFT FEMALE BREAST: ICD-10-CM

## 2025-08-19 PROCEDURE — 76641 ULTRASOUND BREAST COMPLETE: CPT | Mod: 50

## 2025-08-19 PROCEDURE — 99213 OFFICE O/P EST LOW 20 MIN: CPT | Mod: 25
